# Patient Record
Sex: FEMALE | Race: WHITE | NOT HISPANIC OR LATINO | Employment: OTHER | URBAN - METROPOLITAN AREA
[De-identification: names, ages, dates, MRNs, and addresses within clinical notes are randomized per-mention and may not be internally consistent; named-entity substitution may affect disease eponyms.]

---

## 2018-01-16 ENCOUNTER — OFFICE VISIT (OUTPATIENT)
Dept: FAMILY MEDICINE | Facility: CLINIC | Age: 73
End: 2018-01-16
Payer: MEDICARE

## 2018-01-16 VITALS
WEIGHT: 124.13 LBS | BODY MASS INDEX: 22.84 KG/M2 | TEMPERATURE: 99 F | OXYGEN SATURATION: 100 % | DIASTOLIC BLOOD PRESSURE: 80 MMHG | HEART RATE: 79 BPM | HEIGHT: 62 IN | SYSTOLIC BLOOD PRESSURE: 136 MMHG

## 2018-01-16 DIAGNOSIS — M25.50 ARTHRALGIA, UNSPECIFIED JOINT: ICD-10-CM

## 2018-01-16 DIAGNOSIS — M16.9 OSTEOARTHRITIS OF HIP, UNSPECIFIED LATERALITY, UNSPECIFIED OSTEOARTHRITIS TYPE: ICD-10-CM

## 2018-01-16 DIAGNOSIS — M79.89 SWELLING OF HAND, UNSPECIFIED LATERALITY: Primary | ICD-10-CM

## 2018-01-16 DIAGNOSIS — Z74.09 OTHER REDUCED MOBILITY: ICD-10-CM

## 2018-01-16 DIAGNOSIS — F41.9 ANXIETY: ICD-10-CM

## 2018-01-16 DIAGNOSIS — I10 ESSENTIAL HYPERTENSION: ICD-10-CM

## 2018-01-16 DIAGNOSIS — M25.40 JOINT SWELLING: ICD-10-CM

## 2018-01-16 PROCEDURE — 99203 OFFICE O/P NEW LOW 30 MIN: CPT | Mod: S$GLB,,, | Performed by: FAMILY MEDICINE

## 2018-01-16 RX ORDER — HYDROCODONE BITARTRATE AND ACETAMINOPHEN 5; 325 MG/1; MG/1
TABLET ORAL
COMMUNITY
Start: 2018-01-02 | End: 2018-04-28

## 2018-01-16 RX ORDER — CALCIUM CARBONATE 1250 MG/5ML
500 SUSPENSION ORAL ONCE
COMMUNITY

## 2018-01-16 RX ORDER — CHOLECALCIFEROL (VITAMIN D3) 125 MCG
1 CAPSULE ORAL
COMMUNITY
End: 2022-06-08

## 2018-01-16 RX ORDER — PREDNISONE 20 MG/1
TABLET ORAL
Qty: 10 TABLET | Refills: 0 | Status: SHIPPED | OUTPATIENT
Start: 2018-01-16 | End: 2018-02-09

## 2018-01-16 RX ORDER — SERTRALINE HYDROCHLORIDE 25 MG/1
25 TABLET, FILM COATED ORAL DAILY
Qty: 30 TABLET | Refills: 11 | Status: SHIPPED | OUTPATIENT
Start: 2018-01-16 | End: 2022-06-08

## 2018-01-18 LAB
ALBUMIN SERPL-MCNC: 3.8 G/DL (ref 3.6–5.1)
ALBUMIN/GLOB SERPL: 1.2 (CALC) (ref 1–2.5)
ALP SERPL-CCNC: 86 U/L (ref 33–130)
ALT SERPL-CCNC: 8 U/L (ref 6–29)
AST SERPL-CCNC: 13 U/L (ref 10–35)
BASOPHILS # BLD AUTO: 77 CELLS/UL (ref 0–200)
BASOPHILS NFR BLD AUTO: 0.9 %
BILIRUB SERPL-MCNC: 0.4 MG/DL (ref 0.2–1.2)
BUN SERPL-MCNC: 17 MG/DL (ref 7–25)
BUN/CREAT SERPL: NORMAL (CALC) (ref 6–22)
CALCIUM SERPL-MCNC: 9.9 MG/DL (ref 8.6–10.4)
CHLORIDE SERPL-SCNC: 105 MMOL/L (ref 98–110)
CHOLEST SERPL-MCNC: 190 MG/DL
CHOLEST/HDLC SERPL: 3 (CALC)
CO2 SERPL-SCNC: 30 MMOL/L (ref 20–31)
CREAT SERPL-MCNC: 0.75 MG/DL (ref 0.6–0.93)
CRP SERPL HS-MCNC: 25.1 MG/L
EOSINOPHIL # BLD AUTO: 198 CELLS/UL (ref 15–500)
EOSINOPHIL NFR BLD AUTO: 2.3 %
ERYTHROCYTE [DISTWIDTH] IN BLOOD BY AUTOMATED COUNT: 11.9 % (ref 11–15)
ERYTHROCYTE [SEDIMENTATION RATE] IN BLOOD BY WESTERGREN METHOD: 79 MM/H
GFR SERPL CREATININE-BSD FRML MDRD: 80 ML/MIN/1.73M2
GLOBULIN SER CALC-MCNC: 3.2 G/DL (CALC) (ref 1.9–3.7)
GLUCOSE SERPL-MCNC: 94 MG/DL (ref 65–99)
HCT VFR BLD AUTO: 36.6 % (ref 35–45)
HDLC SERPL-MCNC: 64 MG/DL
HGB BLD-MCNC: 12.2 G/DL (ref 11.7–15.5)
LDLC SERPL CALC-MCNC: 110 MG/DL (CALC)
LYMPHOCYTES # BLD AUTO: 2116 CELLS/UL (ref 850–3900)
LYMPHOCYTES NFR BLD AUTO: 24.6 %
MCH RBC QN AUTO: 30 PG (ref 27–33)
MCHC RBC AUTO-ENTMCNC: 33.3 G/DL (ref 32–36)
MCV RBC AUTO: 89.9 FL (ref 80–100)
MONOCYTES # BLD AUTO: 542 CELLS/UL (ref 200–950)
MONOCYTES NFR BLD AUTO: 6.3 %
NEUTROPHILS # BLD AUTO: 5667 CELLS/UL (ref 1500–7800)
NEUTROPHILS NFR BLD AUTO: 65.9 %
NONHDLC SERPL-MCNC: 126 MG/DL (CALC)
PLATELET # BLD AUTO: 401 THOUSAND/UL (ref 140–400)
PMV BLD REES-ECKER: 10 FL (ref 7.5–12.5)
POTASSIUM SERPL-SCNC: 4.2 MMOL/L (ref 3.5–5.3)
PROT SERPL-MCNC: 7 G/DL (ref 6.1–8.1)
RBC # BLD AUTO: 4.07 MILLION/UL (ref 3.8–5.1)
SODIUM SERPL-SCNC: 141 MMOL/L (ref 135–146)
TRIGL SERPL-MCNC: 74 MG/DL
TSH SERPL-ACNC: 0.55 MIU/L (ref 0.4–4.5)
URATE SERPL-MCNC: 5.5 MG/DL (ref 2.5–7)
WBC # BLD AUTO: 8.6 THOUSAND/UL (ref 3.8–10.8)

## 2018-01-19 ENCOUNTER — TELEPHONE (OUTPATIENT)
Dept: FAMILY MEDICINE | Facility: CLINIC | Age: 73
End: 2018-01-19

## 2018-01-19 DIAGNOSIS — Z12.11 COLON CANCER SCREENING: ICD-10-CM

## 2018-01-19 NOTE — TELEPHONE ENCOUNTER
Patient returned your phone call - I tried to help her.   She did get your message but she wanted to discuss results  With you.  I advised you were not in today and she should receive a phone call monday

## 2018-01-20 NOTE — PROGRESS NOTES
Patient ID: Yajaira Garcia is a 72 y.o. female.    Chief Complaint: Arm Pain (left arm pain)    HPI      Yajaira Garcia is a 72 y.o. female. here for initial examination.   Patient with complaints of left hand and wrist swelling within the last several weeks.  Mild erythema mild swelling and moderate to severe pain.  Seen at previous urgent care given steroids mild relief of symptoms.  Does have right hand-recent tendinitis-awaiting surgery with Dr. Jesse Montenegro.  No history of gout or other inflammatory disease.  No recent trauma to the left hand area.  No fever chills nausea vomiting or diarrhea.  Hypertension-controlled normal resting milligrams daily  Patient states that she has tremendous amount of anxiety stress is depressed.  Patient admits distress because of abdominal business.        Review of Symptoms    Constitutional: Negative.    HENT: Negative.    Eyes: Negative.    Respiratory: Negative.    Cardiovascular: Negative.    Gastrointestinal: Negative.    Endocrine: Negative.    Genitourinary: Negative.    Musculoskeletal: Negative.    Skin: Negative.    Allergic/Immunologic: Negative.    Neurological: Negative.    Hematological: Negative.    Psychiatric/Behavioral: Negative.      Except as above in HPI        Physical  Exam    Constitutional:  Oriented to person, place, and time. Appears well-developed and well-nourished.     HENT:   Head: Normocephalic and atraumatic.     Right Ear: Tympanic membrane, external ear and ear canal normal.     Left Ear: Tympanic membrane, external ear and ear canal normal.     Nose: Nose normal. No rhinorrhea or nasal deformity.     Mouth/Throat: Uvula is midline, oropharynx is clear and moist and mucous membranes are normal.      Eyes: Conjunctivae are normal. Right eye exhibits no discharge. Left eye exhibits no discharge. No scleral icterus.     Neck:  No JVD present. No tracheal deviation  [x]  Neck supple.   [x]  No Carotid bruit    Cardiovascular: Normal rate, regular  rhythm and normal heart sounds.      Pulmonary/Chest: Effort normal and breath sounds normal. No stridor. No respiratory distress. No wheezes. No rales.      Musculoskeletal: Normal range of motion. No edema or tenderness.   No deformity  upper and lower extremity except for left hand and wrist and right hand  Left hand 1+ swelling no skin changes mild erythema-swelling of fingers.  Range of motion within normal limits except for swelling    Right hand decreased range of motion right thumb because of pain    Lymphadenopathy:  No cervical adenopathy.     Neurological:  Alert and oriented to person, place, and time. Coordination normal.     Skin: Skin is warm and dry. No rash noted.     Psychiatric: Normal mood and affect. Speech is normal and behavior is normal. Judgment and thought content normal.     Complete Blood Count  Lab Results   Component Value Date    RBC 4.07 01/17/2018    HGB 12.2 01/17/2018    HCT 36.6 01/17/2018    MCV 89.9 01/17/2018    MCH 30.0 01/17/2018    MCHC 33.3 01/17/2018    RDW 11.9 01/17/2018     (H) 01/17/2018    MPV 10.0 01/17/2018    GRAN 4.7 12/21/2016    GRAN 56.7 12/21/2016    LYMPH 2,116 01/17/2018    LYMPH 24.6 01/17/2018    MONO 542 01/17/2018    MONO 6.3 01/17/2018     01/17/2018    BASO 77 01/17/2018    EOSINOPHIL 2.3 01/17/2018    BASOPHIL 0.9 01/17/2018    DIFFMETHOD Automated 12/21/2016       Comprehensive Metabolic Panel  Lab Results   Component Value Date    GLU 94 01/17/2018    BUN 17 01/17/2018    CREATININE 0.75 01/17/2018     01/17/2018    K 4.2 01/17/2018     01/17/2018    PROT 7.0 01/17/2018    ALBUMIN 3.8 01/17/2018    BILITOT 0.4 01/17/2018    AST 13 01/17/2018    ALKPHOS 86 01/17/2018    CO2 30 01/17/2018    ALT 8 01/17/2018    EGFRNONAA 80 01/17/2018    ESTGFRAFRICA 93 01/17/2018       TSH  Lab Results   Component Value Date    TSH 0.55 01/17/2018       Assessment / Plan:      ICD-10-CM ICD-9-CM   1. Swelling of hand, unspecified laterality  M79.89 729.81   2. Arthralgia, unspecified joint M25.50 719.40   3. Osteoarthritis of hip, unspecified laterality, unspecified osteoarthritis type M16.9 715.95   4. Anxiety F41.9 300.00   5. Essential hypertension I10 401.9   6. Joint swelling M25.40 719.00   7. Other reduced mobility  Z74.09 799.89     Swelling of hand, unspecified laterality  -     Comprehensive metabolic panel; Future  -     CBC auto differential; Future  -     Lipid panel; Future  -     TSH; Future  -     High sensitivity CRP (Cardiac CRP); Future; Expected date: 01/16/2018  -     Sedimentation rate, manual; Future; Expected date: 01/16/2018  -     Uric acid; Future; Expected date: 01/16/2018    Arthralgia, unspecified joint  -     Comprehensive metabolic panel; Future  -     CBC auto differential; Future  -     Lipid panel; Future  -     TSH; Future  -     High sensitivity CRP (Cardiac CRP); Future; Expected date: 01/16/2018  -     Sedimentation rate, manual; Future; Expected date: 01/16/2018  -     Uric acid; Future; Expected date: 01/16/2018    Osteoarthritis of hip, unspecified laterality, unspecified osteoarthritis type  -     Comprehensive metabolic panel; Future  -     CBC auto differential; Future  -     Lipid panel; Future  -     TSH; Future  -     High sensitivity CRP (Cardiac CRP); Future; Expected date: 01/16/2018  -     Sedimentation rate, manual; Future; Expected date: 01/16/2018  -     Uric acid; Future; Expected date: 01/16/2018    Anxiety  -     Comprehensive metabolic panel; Future  -     CBC auto differential; Future  -     Lipid panel; Future  -     TSH; Future  -     High sensitivity CRP (Cardiac CRP); Future; Expected date: 01/16/2018  -     Sedimentation rate, manual; Future; Expected date: 01/16/2018  -     Uric acid; Future; Expected date: 01/16/2018    Essential hypertension  -     Comprehensive metabolic panel; Future  -     CBC auto differential; Future  -     Lipid panel; Future  -     TSH; Future  -     High  sensitivity CRP (Cardiac CRP); Future; Expected date: 01/16/2018  -     Sedimentation rate, manual; Future; Expected date: 01/16/2018  -     Uric acid; Future; Expected date: 01/16/2018    Joint swelling  -     Comprehensive metabolic panel; Future  -     CBC auto differential; Future  -     Lipid panel; Future  -     TSH; Future  -     High sensitivity CRP (Cardiac CRP); Future; Expected date: 01/16/2018  -     Sedimentation rate, manual; Future; Expected date: 01/16/2018  -     Uric acid; Future; Expected date: 01/16/2018    Other reduced mobility   -     High sensitivity CRP (Cardiac CRP); Future; Expected date: 01/16/2018    Other orders  -     predniSONE (DELTASONE) 20 MG tablet; Two po daily  Dispense: 10 tablet; Refill: 0  -     sertraline (ZOLOFT) 25 MG tablet; Take 1 tablet (25 mg total) by mouth once daily.  Dispense: 30 tablet; Refill: 11  -     Sedimentation rate, automated          Pressure of inflammatory reason for swelling left-start prednisone taper test CRP sedimentation rate    Discussed depression-anxiety-suggested using Zoloft but notices increase in the next couple months.  Discussed that primary goal would be to have the side effects then increase to help reduce anxiety

## 2018-02-09 ENCOUNTER — OFFICE VISIT (OUTPATIENT)
Dept: FAMILY MEDICINE | Facility: CLINIC | Age: 73
End: 2018-02-09
Payer: MEDICARE

## 2018-02-09 VITALS
HEART RATE: 76 BPM | DIASTOLIC BLOOD PRESSURE: 76 MMHG | WEIGHT: 119.38 LBS | OXYGEN SATURATION: 100 % | BODY MASS INDEX: 21.97 KG/M2 | TEMPERATURE: 98 F | SYSTOLIC BLOOD PRESSURE: 130 MMHG | HEIGHT: 62 IN

## 2018-02-09 DIAGNOSIS — M79.89 SWELLING OF HAND, UNSPECIFIED LATERALITY: Primary | ICD-10-CM

## 2018-02-09 DIAGNOSIS — F41.9 ANXIETY: ICD-10-CM

## 2018-02-09 DIAGNOSIS — I10 ESSENTIAL HYPERTENSION: ICD-10-CM

## 2018-02-09 DIAGNOSIS — M25.50 ARTHRALGIA, UNSPECIFIED JOINT: ICD-10-CM

## 2018-02-09 PROCEDURE — 1126F AMNT PAIN NOTED NONE PRSNT: CPT | Mod: S$GLB,,, | Performed by: FAMILY MEDICINE

## 2018-02-09 PROCEDURE — 99212 OFFICE O/P EST SF 10 MIN: CPT | Mod: S$GLB,,, | Performed by: FAMILY MEDICINE

## 2018-02-09 PROCEDURE — 1159F MED LIST DOCD IN RCRD: CPT | Mod: S$GLB,,, | Performed by: FAMILY MEDICINE

## 2018-02-09 RX ORDER — AMOXICILLIN 250 MG
1 CAPSULE ORAL 2 TIMES DAILY
Qty: 60 TABLET | Refills: 0 | Status: CANCELLED | OUTPATIENT
Start: 2018-02-09

## 2018-02-09 RX ORDER — ONDANSETRON 4 MG/1
TABLET, FILM COATED ORAL
COMMUNITY
Start: 2018-01-30 | End: 2022-06-08

## 2018-02-17 ENCOUNTER — HOSPITAL ENCOUNTER (OUTPATIENT)
Dept: RADIOLOGY | Facility: HOSPITAL | Age: 73
Discharge: HOME | End: 2018-02-17
Attending: FAMILY MEDICINE
Payer: MEDICARE

## 2018-02-17 VITALS — WEIGHT: 119 LBS | BODY MASS INDEX: 21.9 KG/M2 | HEIGHT: 62 IN

## 2018-02-17 DIAGNOSIS — Z12.31 SCREENING MAMMOGRAM, ENCOUNTER FOR: ICD-10-CM

## 2018-02-17 PROCEDURE — 77067 SCR MAMMO BI INCL CAD: CPT | Mod: TC,PO

## 2018-02-18 RX ORDER — AMOXICILLIN 250 MG
1 CAPSULE ORAL 2 TIMES DAILY
Qty: 60 TABLET | Refills: 0 | Status: SHIPPED | OUTPATIENT
Start: 2018-02-18 | End: 2022-06-08

## 2018-02-19 ENCOUNTER — TELEPHONE (OUTPATIENT)
Dept: FAMILY MEDICINE | Facility: CLINIC | Age: 73
End: 2018-02-19

## 2018-02-19 NOTE — PROGRESS NOTES
Patient ID: Yajaira Garcia is a 72 y.o. female.    Chief Complaint: Follow-up    HPI       Yajaira Garcia is a 72 y.o. female following up on left hand which is getting better with less pain and swelling.  Pt also with better mood and doing well on this dose of medicine and she does not want to change      Review of Symptoms    Constitutional  No change in activity, No chills fever   Resp  Neg hemoptysis, stridor, choking  CVS  Neg chest pain, palpitations    Physical Exam    Constitutional:   Oriented to person, place, and time.appears well-developed and well-nourished.   No distress.     HENT  Head: Normocephalic and atraumatic  Right Ear: External ear normal.   Left Ear: External ear normal.   Nose: External nose normal.   Mouth: Moist mucous membranes    Eyes:   Conjunctivae are normal. Right eye exhibits no discharge. Left eye exhibits no discharge. No scleral icterus. No periorbital edema    Musculoskeletal:  No edema. No obvious deformity No wasting     Neurological:  Alert and oriented to person, place, and time. Coordination normal.     Skin:   Skin is warm and dry.  No diaphoresis.   No rash noted.     Psychiatric: Normal mood and affect. Behavior is normal. Judgment and thought content normal.       Assessment / Plan:      ICD-10-CM ICD-9-CM   1. Swelling of hand, unspecified laterality M79.89 729.81   2. Arthralgia, unspecified joint M25.50 719.40   3. Anxiety F41.9 300.00   4. Essential hypertension I10 401.9     Swelling of hand, unspecified laterality    Arthralgia, unspecified joint    Anxiety    Essential hypertension    Other orders  -     Cancel: senna-docusate 8.6-50 mg (PERICOLACE) 8.6-50 mg per tablet; Take 1 tablet by mouth 2 (two) times daily.  Dispense: 60 tablet; Refill: 0  -     senna-docusate 8.6-50 mg (PERICOLACE) 8.6-50 mg per tablet; Take 1 tablet by mouth 2 (two) times daily.  Dispense: 60 tablet; Refill: 0

## 2018-02-20 ENCOUNTER — TELEPHONE (OUTPATIENT)
Dept: RADIOLOGY | Facility: HOSPITAL | Age: 73
End: 2018-02-20

## 2018-02-20 NOTE — TELEPHONE ENCOUNTER
Your mammogram was not complete additional views will be needed.  They should contact you if not please call our office.

## 2018-02-21 ENCOUNTER — HOSPITAL ENCOUNTER (OUTPATIENT)
Dept: RADIOLOGY | Facility: HOSPITAL | Age: 73
Discharge: HOME | End: 2018-02-21
Attending: FAMILY MEDICINE
Payer: MEDICARE

## 2018-02-21 DIAGNOSIS — R92.8 ABNORMAL MAMMOGRAM: ICD-10-CM

## 2018-02-21 NOTE — TELEPHONE ENCOUNTER
I left message for the pt to rtn call  To discuss mammogram results   See message below  I see pt had u/s scheduled yesterday and canceled

## 2018-02-22 NOTE — TELEPHONE ENCOUNTER
I spoke with the pt daughter and she has a skin tag on her breast that was causing the concern.   She said the radiologist reread the mammogram and everything is ok.

## 2018-02-26 RX ORDER — AMLODIPINE BESYLATE 10 MG/1
10 TABLET ORAL DAILY
Qty: 90 TABLET | Refills: 0 | Status: SHIPPED | OUTPATIENT
Start: 2018-02-26 | End: 2018-05-31 | Stop reason: SDUPTHER

## 2018-04-28 ENCOUNTER — HOSPITAL ENCOUNTER (EMERGENCY)
Facility: HOSPITAL | Age: 73
Discharge: HOME | End: 2018-04-28
Attending: EMERGENCY MEDICINE
Payer: MEDICARE

## 2018-04-28 VITALS
BODY MASS INDEX: 21.53 KG/M2 | WEIGHT: 117 LBS | SYSTOLIC BLOOD PRESSURE: 154 MMHG | DIASTOLIC BLOOD PRESSURE: 67 MMHG | RESPIRATION RATE: 20 BRPM | OXYGEN SATURATION: 96 % | HEIGHT: 62 IN | TEMPERATURE: 98 F | HEART RATE: 56 BPM

## 2018-04-28 DIAGNOSIS — M79.673 FOOT PAIN: ICD-10-CM

## 2018-04-28 DIAGNOSIS — M79.671 RIGHT FOOT PAIN: Primary | ICD-10-CM

## 2018-04-28 PROCEDURE — 25000003 PHARM REV CODE 250: Performed by: EMERGENCY MEDICINE

## 2018-04-28 PROCEDURE — 99283 EMERGENCY DEPT VISIT LOW MDM: CPT

## 2018-04-28 RX ORDER — TRAMADOL HYDROCHLORIDE 50 MG/1
50 TABLET ORAL
Status: COMPLETED | OUTPATIENT
Start: 2018-04-28 | End: 2018-04-28

## 2018-04-28 RX ORDER — HYDROCODONE BITARTRATE AND ACETAMINOPHEN 5; 325 MG/1; MG/1
1 TABLET ORAL EVERY 4 HOURS PRN
Qty: 18 TABLET | Refills: 0 | Status: SHIPPED | OUTPATIENT
Start: 2018-04-28

## 2018-04-28 RX ORDER — HYDROCODONE BITARTRATE AND ACETAMINOPHEN 5; 325 MG/1; MG/1
1 TABLET ORAL EVERY 4 HOURS PRN
Qty: 18 TABLET | Refills: 0 | Status: CANCELLED | OUTPATIENT
Start: 2018-04-28

## 2018-04-28 RX ADMIN — TRAMADOL HYDROCHLORIDE 50 MG: 50 TABLET, COATED ORAL at 11:04

## 2018-04-29 NOTE — ED PROVIDER NOTES
Encounter Date: 4/28/2018       History     Chief Complaint   Patient presents with    Foot Injury     right foot swelling and pain that began today, no injury      Patient comes emergency Department with a one-day history of right heel and right lateral foot pain pain is worse with weightbearing and is described as moderate to severe in intensity.  Patient is unaware of any trauma or injury.          Review of patient's allergies indicates:  No Known Allergies  Past Medical History:   Diagnosis Date    Arthritis     Hypertension     Stroke 10/2015    no residuals     Past Surgical History:   Procedure Laterality Date    CARPAL TUNNEL RELEASE Bilateral 1998    Gastric Sleeve  08/2016    HIATAL HERNIA REPAIR  08/2016    HIP SURGERY Right 12/27/2017    KIDNEY STONE SURGERY Right     2010    TUBAL LIGATION       Family History   Problem Relation Age of Onset    Dementia Mother     Cancer Father     No Known Problems Brother      Social History   Substance Use Topics    Smoking status: Never Smoker    Smokeless tobacco: Never Used    Alcohol use No     Review of Systems   Constitutional: Negative for fatigue and fever.   HENT: Negative for sore throat.    Respiratory: Negative for chest tightness and shortness of breath.    Cardiovascular: Negative for chest pain and leg swelling.   Gastrointestinal: Negative for abdominal pain, nausea and vomiting.   Genitourinary: Negative for dysuria.   Musculoskeletal: Negative for back pain and joint swelling.        See history of present illness   Skin: Negative for rash.   Neurological: Negative for weakness.   Hematological: Does not bruise/bleed easily.   All other systems reviewed and are negative.      Physical Exam     Initial Vitals   BP Pulse Resp Temp SpO2   04/28/18 2120 04/28/18 2120 04/28/18 2120 04/28/18 2123 --   (!) 168/74 (!) 56 18 97.8 °F (36.6 °C)       MAP       04/28/18 2120       105.33         Physical Exam    Nursing note and vitals  reviewed.  Constitutional: Vital signs are normal. She appears well-developed and well-nourished. She is not diaphoretic. No distress.   HENT:   Head: Normocephalic and atraumatic.   Eyes: Conjunctivae and EOM are normal. Pupils are equal, round, and reactive to light.   Neck: Normal range of motion. Neck supple.   Cardiovascular: Normal rate, regular rhythm and normal heart sounds.   Pulmonary/Chest: Breath sounds normal. No respiratory distress. She has no wheezes. She has no rhonchi. She has no rales.   Abdominal: Soft. She exhibits no distension. There is no tenderness. There is no rebound and no guarding.   Musculoskeletal: Normal range of motion. She exhibits tenderness. She exhibits no edema.   Tenderness to right heel and right lateral foot.  Skin is intact or swelling no deformities.  Foot is warm and well-perfused.   Neurological: She is alert and oriented to person, place, and time.   Skin: Skin is warm and dry.   Psychiatric: She has a normal mood and affect.         ED Course   Procedures  Labs Reviewed - No data to display                          Imaging Results          X-Ray Foot Complete Right (Final result)  Result time 04/28/18 22:21:55    Final result by Cortez Ji MD (Timothy) (04/28/18 22:21:55)                 Impression:        Calcaneal spurring.  No acute bony abnormalities.      Electronically signed by: CORTEZ JI MD  Date:     04/28/18  Time:    22:21              Narrative:    Right foot, 3 views    Clinical History:  Right foot pain    Findings:     There is  no acute bony or joint abnormality. No acute fracture or dislocation.There is spurring of the calcaneus.                                    Clinical Impression:   The primary encounter diagnosis was Right foot pain. A diagnosis of Foot pain was also pertinent to this visit.    Disposition:   Disposition: Discharged  Condition: Stable                        Orlin Cisneros MD  04/28/18 2578

## 2018-04-29 NOTE — ED NOTES
Pt provided with a walker instead of crutches per request. MD aware. Pt returned demonstration to ambulate with walker successfully.

## 2018-05-31 RX ORDER — AMLODIPINE BESYLATE 10 MG/1
TABLET ORAL
Qty: 90 TABLET | Refills: 0 | Status: SHIPPED | OUTPATIENT
Start: 2018-05-31 | End: 2018-09-01 | Stop reason: SDUPTHER

## 2018-09-03 RX ORDER — AMLODIPINE BESYLATE 10 MG/1
TABLET ORAL
Qty: 90 TABLET | Refills: 0 | Status: SHIPPED | OUTPATIENT
Start: 2018-09-03 | End: 2022-06-08

## 2019-01-24 DIAGNOSIS — Z12.11 COLON CANCER SCREENING: ICD-10-CM

## 2019-09-09 ENCOUNTER — PATIENT OUTREACH (OUTPATIENT)
Dept: ADMINISTRATIVE | Facility: HOSPITAL | Age: 74
End: 2019-09-09

## 2021-10-12 ENCOUNTER — LAB VISIT (OUTPATIENT)
Dept: LAB | Facility: HOSPITAL | Age: 76
End: 2021-10-12
Payer: MEDICARE

## 2021-10-12 DIAGNOSIS — Z01.818 ENCOUNTER FOR OTHER PREPROCEDURAL EXAMINATION: Primary | ICD-10-CM

## 2021-10-12 LAB
ANION GAP SERPL CALC-SCNC: 9 MMOL/L (ref 8–16)
BACTERIA #/AREA URNS AUTO: ABNORMAL /HPF
BASOPHILS # BLD AUTO: 0 K/UL (ref 0–0.2)
BASOPHILS NFR BLD: 0 % (ref 0–1.9)
BILIRUB UR QL STRIP: NEGATIVE
CALCIUM SERPL-MCNC: 10.1 MG/DL (ref 8.7–10.5)
CHLORIDE SERPL-SCNC: 106 MMOL/L (ref 95–110)
CLARITY UR REFRACT.AUTO: CLEAR
CO2 SERPL-SCNC: 26 MMOL/L (ref 23–29)
COLOR UR AUTO: ABNORMAL
CREAT SERPL-MCNC: 0.89 MG/DL (ref 0.5–1.4)
DIFFERENTIAL METHOD: ABNORMAL
EOSINOPHIL # BLD AUTO: 0 K/UL (ref 0–0.5)
EOSINOPHIL NFR BLD: 0 % (ref 0–8)
ERYTHROCYTE [DISTWIDTH] IN BLOOD BY AUTOMATED COUNT: 13.6 % (ref 11.5–14.5)
EST. GFR  (AFRICAN AMERICAN): >60 ML/MIN/1.73 M^2
EST. GFR  (NON AFRICAN AMERICAN): >60 ML/MIN/1.73 M^2
GLUCOSE SERPL-MCNC: 117 MG/DL (ref 70–110)
GLUCOSE UR QL STRIP: NEGATIVE
HCT VFR BLD AUTO: 38.7 % (ref 37–48.5)
HGB BLD-MCNC: 12.6 G/DL (ref 12–16)
HGB UR QL STRIP: ABNORMAL
HYALINE CASTS UR QL AUTO: 2 /LPF
IMM GRANULOCYTES # BLD AUTO: 0.06 K/UL (ref 0–0.04)
IMM GRANULOCYTES NFR BLD AUTO: 0.5 % (ref 0–0.5)
KETONES UR QL STRIP: NEGATIVE
LEUKOCYTE ESTERASE UR QL STRIP: ABNORMAL
LYMPHOCYTES # BLD AUTO: 0.9 K/UL (ref 1–4.8)
LYMPHOCYTES NFR BLD: 7.3 % (ref 18–48)
MCH RBC QN AUTO: 30.4 PG (ref 27–31)
MCHC RBC AUTO-ENTMCNC: 32.6 G/DL (ref 32–36)
MCV RBC AUTO: 94 FL (ref 82–98)
MICROSCOPIC COMMENT: ABNORMAL
MONOCYTES # BLD AUTO: 0.5 K/UL (ref 0.3–1)
MONOCYTES NFR BLD: 3.9 % (ref 4–15)
NEUTROPHILS # BLD AUTO: 10.5 K/UL (ref 1.8–7.7)
NEUTROPHILS NFR BLD: 88.3 % (ref 38–73)
NITRITE UR QL STRIP: NEGATIVE
NRBC BLD-RTO: 0 /100 WBC
PH UR STRIP: 5 [PH] (ref 5–8)
PLATELET # BLD AUTO: 304 K/UL (ref 150–450)
PMV BLD AUTO: 10 FL (ref 9.2–12.9)
POTASSIUM SERPL-SCNC: 4.9 MMOL/L (ref 3.5–5.1)
PROT UR QL STRIP: ABNORMAL
RBC # BLD AUTO: 4.14 M/UL (ref 4–5.4)
RBC #/AREA URNS AUTO: 0 /HPF (ref 0–4)
SODIUM SERPL-SCNC: 141 MMOL/L (ref 136–145)
SP GR UR STRIP: 1.01 (ref 1–1.03)
URN SPEC COLLECT METH UR: ABNORMAL
UROBILINOGEN UR STRIP-ACNC: 1 EU/DL
UUN UR-MCNC: 30 MG/DL (ref 7–17)
WBC # BLD AUTO: 11.86 K/UL (ref 3.9–12.7)
WBC #/AREA URNS AUTO: 3 /HPF (ref 0–5)

## 2021-10-12 PROCEDURE — 81000 URINALYSIS NONAUTO W/SCOPE: CPT | Mod: PO

## 2021-10-12 PROCEDURE — 85025 COMPLETE CBC W/AUTO DIFF WBC: CPT | Mod: PO

## 2021-10-12 PROCEDURE — 80048 BASIC METABOLIC PNL TOTAL CA: CPT | Mod: PO

## 2021-10-12 PROCEDURE — 36415 COLL VENOUS BLD VENIPUNCTURE: CPT | Mod: PO

## 2021-10-13 ENCOUNTER — HOSPITAL ENCOUNTER (OUTPATIENT)
Dept: CARDIOLOGY | Facility: HOSPITAL | Age: 76
Discharge: HOME | End: 2021-10-13
Payer: MEDICARE

## 2021-10-13 DIAGNOSIS — Z01.818 PREOP EXAMINATION: ICD-10-CM

## 2021-10-13 PROCEDURE — 93010 EKG 12-LEAD: ICD-10-PCS | Mod: ,,, | Performed by: INTERNAL MEDICINE

## 2021-10-13 PROCEDURE — 93010 ELECTROCARDIOGRAM REPORT: CPT | Mod: ,,, | Performed by: INTERNAL MEDICINE

## 2021-10-13 PROCEDURE — 93005 ELECTROCARDIOGRAM TRACING: CPT | Mod: PO

## 2021-10-26 ENCOUNTER — OFFICE VISIT (OUTPATIENT)
Dept: FAMILY MEDICINE | Facility: CLINIC | Age: 76
End: 2021-10-26
Payer: MEDICARE

## 2021-10-26 ENCOUNTER — TELEPHONE (OUTPATIENT)
Dept: FAMILY MEDICINE | Facility: CLINIC | Age: 76
End: 2021-10-26
Payer: MEDICARE

## 2021-10-26 VITALS
TEMPERATURE: 98 F | OXYGEN SATURATION: 96 % | SYSTOLIC BLOOD PRESSURE: 134 MMHG | DIASTOLIC BLOOD PRESSURE: 78 MMHG | BODY MASS INDEX: 22.26 KG/M2 | WEIGHT: 121 LBS | HEIGHT: 62 IN | HEART RATE: 56 BPM

## 2021-10-26 DIAGNOSIS — N95.9 MENOPAUSAL AND POSTMENOPAUSAL DISORDER: ICD-10-CM

## 2021-10-26 DIAGNOSIS — Z01.818 PREOPERATIVE EXAMINATION: Primary | ICD-10-CM

## 2021-10-26 DIAGNOSIS — Z12.11 ENCOUNTER FOR SCREENING FOR MALIGNANT NEOPLASM OF COLON: ICD-10-CM

## 2021-10-26 PROCEDURE — 99204 OFFICE O/P NEW MOD 45 MIN: CPT | Mod: S$GLB,,, | Performed by: FAMILY MEDICINE

## 2021-10-26 PROCEDURE — 99204 PR OFFICE/OUTPT VISIT, NEW, LEVL IV, 45-59 MIN: ICD-10-PCS | Mod: S$GLB,,, | Performed by: FAMILY MEDICINE

## 2021-10-27 ENCOUNTER — HOSPITAL ENCOUNTER (OUTPATIENT)
Dept: RADIOLOGY | Facility: HOSPITAL | Age: 76
Discharge: HOME | End: 2021-10-27
Attending: FAMILY MEDICINE
Payer: MEDICARE

## 2021-10-27 DIAGNOSIS — N95.9 MENOPAUSAL AND POSTMENOPAUSAL DISORDER: ICD-10-CM

## 2021-10-27 PROCEDURE — 77080 DXA BONE DENSITY AXIAL: CPT | Mod: TC,PO

## 2021-10-28 ENCOUNTER — TELEPHONE (OUTPATIENT)
Dept: FAMILY MEDICINE | Facility: CLINIC | Age: 76
End: 2021-10-28
Payer: MEDICARE

## 2021-11-13 ENCOUNTER — TELEPHONE (OUTPATIENT)
Dept: FAMILY MEDICINE | Facility: CLINIC | Age: 76
End: 2021-11-13
Payer: MEDICARE

## 2021-11-13 LAB — NONINV COLON CA DNA+OCC BLD SCRN STL QL: NEGATIVE

## 2022-06-02 ENCOUNTER — TELEPHONE (OUTPATIENT)
Dept: FAMILY MEDICINE | Facility: CLINIC | Age: 77
End: 2022-06-02
Payer: MEDICARE

## 2022-06-02 NOTE — TELEPHONE ENCOUNTER
Please advise on when you would like pt scheduled    ----- Message from Manasa Cano sent at 6/2/2022  3:47 PM CDT -----  Patient stopped by office needing an appointment for surgery clearance  Cataract surgery scheduled for 6/16 & 6/30(left/right)  Please call pt with appointment ( [phone])

## 2022-06-06 ENCOUNTER — TELEPHONE (OUTPATIENT)
Dept: FAMILY MEDICINE | Facility: CLINIC | Age: 77
End: 2022-06-06
Payer: MEDICARE

## 2022-06-06 NOTE — TELEPHONE ENCOUNTER
----- Message from Chel Todd sent at 6/6/2022  9:58 AM CDT -----  Needs advice from nurse:      Who Called:pt  Regarding:needs to scheduled surgery clearance-surgery is scheduled for 6/16  Would the patient rather a call back or [facility]IA [facility]?  Best Call Back number:[phone]  Additional Info:

## 2022-06-07 ENCOUNTER — TELEPHONE (OUTPATIENT)
Dept: FAMILY MEDICINE | Facility: CLINIC | Age: 77
End: 2022-06-07
Payer: MEDICARE

## 2022-06-07 NOTE — TELEPHONE ENCOUNTER
----- Message from Monae Santacruz sent at 6/7/2022 10:01 AM CDT -----  Regarding: surgery clearance  Contact: [phone]  Patient is requesting a call back regarding scheduling an appt for Cataract Surgery clearance. She is scheduled on 06/16 at 10am.   Would the patient rather a call back or a response via [facility]?  Call   Best Call Back Number:  [phone]  Additional Information:

## 2022-06-08 ENCOUNTER — OFFICE VISIT (OUTPATIENT)
Dept: FAMILY MEDICINE | Facility: CLINIC | Age: 77
End: 2022-06-08
Payer: MEDICARE

## 2022-06-08 VITALS
SYSTOLIC BLOOD PRESSURE: 180 MMHG | WEIGHT: 131.5 LBS | HEIGHT: 62 IN | TEMPERATURE: 99 F | OXYGEN SATURATION: 98 % | HEART RATE: 67 BPM | BODY MASS INDEX: 24.2 KG/M2 | DIASTOLIC BLOOD PRESSURE: 70 MMHG

## 2022-06-08 DIAGNOSIS — I10 HYPERTENSION, UNSPECIFIED TYPE: Primary | ICD-10-CM

## 2022-06-08 DIAGNOSIS — Z01.818 PREOPERATIVE EXAMINATION: ICD-10-CM

## 2022-06-08 PROCEDURE — 99213 OFFICE O/P EST LOW 20 MIN: CPT | Mod: S$GLB,,, | Performed by: FAMILY MEDICINE

## 2022-06-08 PROCEDURE — 99213 PR OFFICE/OUTPT VISIT, EST, LEVL III, 20-29 MIN: ICD-10-PCS | Mod: S$GLB,,, | Performed by: FAMILY MEDICINE

## 2022-06-08 RX ORDER — IBUPROFEN 800 MG/1
800 TABLET ORAL 3 TIMES DAILY
COMMUNITY
Start: 2022-06-07

## 2022-06-08 RX ORDER — PREDNISONE 20 MG/1
20 TABLET ORAL 2 TIMES DAILY
COMMUNITY
Start: 2022-06-07

## 2022-06-08 RX ORDER — AMLODIPINE BESYLATE 10 MG/1
10 TABLET ORAL DAILY
Qty: 30 TABLET | Refills: 11 | Status: SHIPPED | OUTPATIENT
Start: 2022-06-08 | End: 2022-10-12 | Stop reason: SDUPTHER

## 2022-06-08 NOTE — PROGRESS NOTES
Subjective:       Patient ID: Yajaira Garcia is a 76 y.o. female.    Chief Complaint: Pre-op Exam    75 y/o fmale with hx of arthritis and HTN, not on any regular medication, was supposed to be on Amlodipine , not taking,  At present on Prednisone for Arthritis  Denies headache or dizziness        Review of Systems    Objective:      Physical Exam  Vitals and nursing note reviewed.   Constitutional:       General: She is not in acute distress.     Appearance: Normal appearance. She is well-developed. She is not diaphoretic.   HENT:      Head: Normocephalic and atraumatic.      Right Ear: Tympanic membrane normal.      Left Ear: Tympanic membrane normal.      Nose: Nose normal.      Mouth/Throat:      Mouth: Mucous membranes are moist.      Pharynx: Oropharynx is clear.   Eyes:      General: Lids are normal.      Extraocular Movements: Extraocular movements intact.      Conjunctiva/sclera: Conjunctivae normal.      Pupils: Pupils are equal, round, and reactive to light.   Neck:      Trachea: Trachea and phonation normal.   Cardiovascular:      Rate and Rhythm: Normal rate and regular rhythm.      Pulses: Normal pulses.      Heart sounds: Normal heart sounds.   Pulmonary:      Effort: Pulmonary effort is normal.      Breath sounds: Normal breath sounds.   Abdominal:      General: Bowel sounds are normal. There is no abdominal bruit.      Palpations: Abdomen is soft. There is no mass or pulsatile mass.   Musculoskeletal:         General: No deformity.      Cervical back: Full passive range of motion without pain, normal range of motion and neck supple.   Skin:     General: Skin is warm and dry.   Neurological:      Mental Status: She is alert and oriented to person, place, and time.      Sensory: No sensory deficit.      Deep Tendon Reflexes: Reflexes are normal and symmetric.   Psychiatric:         Speech: Speech normal.         Behavior: Behavior normal. Behavior is cooperative.         Thought Content: Thought  content normal.         Judgment: Judgment normal.         Assessment:       No diagnosis found.    Plan:         Yajaira was seen today for pre-op exam.    Diagnoses and all orders for this visit:    Hypertension, unspecified type    Preoperative examination    Other orders  -     amLODIPine (NORVASC) 10 MG tablet; Take 1 tablet (10 mg total) by mouth once daily.     Pt will start medic , will need re-check BP in 5 days, otherwise she is cleared for eye sx

## 2022-06-14 ENCOUNTER — CLINICAL SUPPORT (OUTPATIENT)
Dept: FAMILY MEDICINE | Facility: CLINIC | Age: 77
End: 2022-06-14
Payer: MEDICARE

## 2022-06-14 VITALS — DIASTOLIC BLOOD PRESSURE: 82 MMHG | HEART RATE: 73 BPM | SYSTOLIC BLOOD PRESSURE: 130 MMHG

## 2022-06-14 DIAGNOSIS — I10 ESSENTIAL HYPERTENSION: Primary | ICD-10-CM

## 2022-06-14 NOTE — PROGRESS NOTES
Yajaira Garcia 76 y.o. female is here today for Blood Pressure check.   History of HTN yes.    Review of patient's allergies indicates:  No Known Allergies  Creatinine   Date Value Ref Range Status   10/12/2021 0.89 0.50 - 1.40 mg/dL Final     Sodium   Date Value Ref Range Status   10/12/2021 141 136 - 145 mmol/L Final     Potassium   Date Value Ref Range Status   10/12/2021 4.9 3.5 - 5.1 mmol/L Final   ]  Patient verifies taking blood pressure medications on a regular basis at the same time of the day.     Current Outpatient Medications:     amLODIPine (NORVASC) 10 MG tablet, Take 1 tablet (10 mg total) by mouth once daily., Disp: 30 tablet, Rfl: 11    calcium carbonate 500 mg/5 mL (1,250 mg/5 mL), Take 500 mg by mouth once., Disp: , Rfl:     hydrocodone-acetaminophen 5-325mg (NORCO) 5-325 mg per tablet, Take 1 tablet by mouth every 4 (four) hours as needed., Disp: 18 tablet, Rfl: 0    ibuprofen (ADVIL,MOTRIN) 800 MG tablet, Take 800 mg by mouth 3 (three) times daily., Disp: , Rfl:     multivitamin with minerals tablet, Take 2 tablets by mouth once daily., Disp: , Rfl:     predniSONE (DELTASONE) 20 MG tablet, Take 20 mg by mouth 2 (two) times daily., Disp: , Rfl:   Does patient have record of home blood pressure readings no.   Last dose of blood pressure medication was taken this morning.  Patient is asymptomatic.         ,   .    Blood pressure reading after 15 minutes was 130/82 Pulse 73.  Dr. Rodriguez notified.

## 2022-10-12 ENCOUNTER — TELEPHONE (OUTPATIENT)
Dept: FAMILY MEDICINE | Facility: CLINIC | Age: 77
End: 2022-10-12
Payer: MEDICARE

## 2022-10-12 RX ORDER — AMLODIPINE BESYLATE 10 MG/1
10 TABLET ORAL DAILY
Qty: 90 TABLET | Refills: 3 | Status: SHIPPED | OUTPATIENT
Start: 2022-10-12 | End: 2024-01-31

## 2022-10-12 NOTE — TELEPHONE ENCOUNTER
Patient had eye procedure today  BP elevated during procedure  228/86  230/96  Upon discharge 206/78  Out of amlodipine for 1 month  Appt scheduled dec 12  Can you please refill this med today     We need to call and notify pt daughterchel once done    ----- Message from Santa Garrido sent at 10/12/2022 10:18 AM CDT -----  Type:  Needs Medical Advice    Who Called: pt daughter  Symptoms (please be specific): pt is requesting a return call to discuss blood pressure medicine    Would the patient rather a call back or a response via [facility]? call  Best Call Back Number: [phone]  Additional Information:     Type:  RX Refill Request    Who Called: pt daughter  Refill or New Rx:refill  RX Name and Strength:amLODIPine (NORVASC) 10 MG tablet  How is the patient currently taking it? (ex. 1XDay):Take 1 tablet (10 mg total) by mouth once daily.   Is this a 30 day or 90 day RX:30  Preferred Pharmacy with phone number:[id number] [address]   Phone: [phone]  Fax:  [phone]        Local or Mail Order:local  Ordering Provider:Dr Rodriguez  Would the patient rather a call back or a response via [facility]? call  Best Call Back Number:[phone]  Additional Information:

## 2022-10-13 NOTE — TELEPHONE ENCOUNTER
Phylicia Guerrero, DO  [facility] Staff Yesterday (1:36 PM)     I have sent refills of the amlodipine.  Have her check her BP at home.

## 2022-12-12 ENCOUNTER — TELEPHONE (OUTPATIENT)
Dept: FAMILY MEDICINE | Facility: CLINIC | Age: 77
End: 2022-12-12

## 2022-12-12 NOTE — TELEPHONE ENCOUNTER
----- Message from Monae Santacruz sent at 12/12/2022  8:44 AM CST -----  Regarding: same day  Contact: [phone]/marques Cadena  Type:  Same Day Appointment Request    Caller is requesting a same day appointment.  Caller declined first available appointment listed below.    Name of Caller: marques Cadena   When is the first available appointment?   Symptoms: medication check up/Annual   Best Call Back Number: [phone]/marques Cadena  Additional Information:

## 2023-03-28 ENCOUNTER — OFFICE VISIT (OUTPATIENT)
Dept: FAMILY MEDICINE | Facility: CLINIC | Age: 78
End: 2023-03-28
Payer: MEDICARE

## 2023-03-28 ENCOUNTER — TELEPHONE (OUTPATIENT)
Dept: FAMILY MEDICINE | Facility: CLINIC | Age: 78
End: 2023-03-28

## 2023-03-28 VITALS
HEART RATE: 55 BPM | DIASTOLIC BLOOD PRESSURE: 70 MMHG | TEMPERATURE: 99 F | OXYGEN SATURATION: 98 % | BODY MASS INDEX: 26.86 KG/M2 | HEIGHT: 62 IN | WEIGHT: 145.94 LBS | SYSTOLIC BLOOD PRESSURE: 158 MMHG

## 2023-03-28 DIAGNOSIS — Z23 NEED FOR PNEUMOCOCCAL VACCINATION: ICD-10-CM

## 2023-03-28 DIAGNOSIS — I10 ESSENTIAL HYPERTENSION: Primary | ICD-10-CM

## 2023-03-28 DIAGNOSIS — Z23 NEED FOR INFLUENZA VACCINATION: ICD-10-CM

## 2023-03-28 PROCEDURE — 90677 PCV20 VACCINE IM: CPT | Mod: S$GLB,,, | Performed by: FAMILY MEDICINE

## 2023-03-28 PROCEDURE — G0009 ADMIN PNEUMOCOCCAL VACCINE: HCPCS | Mod: S$GLB,,, | Performed by: FAMILY MEDICINE

## 2023-03-28 PROCEDURE — G0008 FLU VACCINE - QUADRIVALENT - ADJUVANTED: ICD-10-PCS | Mod: S$GLB,,, | Performed by: FAMILY MEDICINE

## 2023-03-28 PROCEDURE — 90677 PNEUMOCOCCAL CONJUGATE VACCINE 20-VALENT: ICD-10-PCS | Mod: S$GLB,,, | Performed by: FAMILY MEDICINE

## 2023-03-28 PROCEDURE — G0008 ADMIN INFLUENZA VIRUS VAC: HCPCS | Mod: S$GLB,,, | Performed by: FAMILY MEDICINE

## 2023-03-28 PROCEDURE — 90694 VACC AIIV4 NO PRSRV 0.5ML IM: CPT | Mod: S$GLB,,, | Performed by: FAMILY MEDICINE

## 2023-03-28 PROCEDURE — 99213 OFFICE O/P EST LOW 20 MIN: CPT | Mod: S$GLB,,, | Performed by: FAMILY MEDICINE

## 2023-03-28 PROCEDURE — 90694 FLU VACCINE - QUADRIVALENT - ADJUVANTED: ICD-10-PCS | Mod: S$GLB,,, | Performed by: FAMILY MEDICINE

## 2023-03-28 PROCEDURE — G0009 PNEUMOCOCCAL CONJUGATE VACCINE 20-VALENT: ICD-10-PCS | Mod: S$GLB,,, | Performed by: FAMILY MEDICINE

## 2023-03-28 PROCEDURE — 99213 PR OFFICE/OUTPT VISIT, EST, LEVL III, 20-29 MIN: ICD-10-PCS | Mod: S$GLB,,, | Performed by: FAMILY MEDICINE

## 2023-03-28 RX ORDER — COLCHICINE 0.6 MG/1
0.6 TABLET ORAL DAILY
Qty: 90 TABLET | Refills: 1 | Status: SHIPPED | OUTPATIENT
Start: 2023-03-28

## 2023-03-28 RX ORDER — COLCHICINE 0.6 MG/1
TABLET ORAL
COMMUNITY
Start: 2023-02-13 | End: 2023-03-28 | Stop reason: SDUPTHER

## 2023-03-28 RX ORDER — CELECOXIB 200 MG/1
CAPSULE ORAL
COMMUNITY
Start: 2023-02-13 | End: 2023-03-28 | Stop reason: SDUPTHER

## 2023-03-28 RX ORDER — NEBIVOLOL 2.5 MG/1
2.5 TABLET ORAL DAILY
Qty: 90 TABLET | Refills: 1 | Status: SHIPPED | OUTPATIENT
Start: 2023-03-28 | End: 2024-03-27

## 2023-03-28 RX ORDER — CELECOXIB 200 MG/1
200 CAPSULE ORAL DAILY
Qty: 90 CAPSULE | Refills: 1 | Status: SHIPPED | OUTPATIENT
Start: 2023-03-28

## 2023-03-28 RX ORDER — VALSARTAN 80 MG/1
80 TABLET ORAL DAILY
Qty: 90 TABLET | Refills: 3 | Status: SHIPPED | OUTPATIENT
Start: 2023-03-28 | End: 2023-04-11 | Stop reason: SDUPTHER

## 2023-03-28 NOTE — TELEPHONE ENCOUNTER
Dr Edward Freedman   This is Surinder Garcia   My wife Yajaira was prescribed for her blood pressure medicine which  is over $200.00  Please advise   Thanks   Surinder Garcia     This was sent via his portal      nebivoloL (BYSTOLIC) 2.5 MG Tab

## 2023-04-03 NOTE — PROGRESS NOTES
" Patient ID: Yajaira Garcia is a 77 y.o. female.    Chief Complaint: Annual Exam and Joint Pain    HPI      Yajaira Garcia is a 77 y.o. female here for annual examination.  Complains joint pain.  Complains that she is experiencing arthritic pain without relief significantly with over-the-counter medications.    Hypertension-blood pressure is not controlled on current medications but not having any symptoms.    Vitals:    03/28/23 1450   BP: (!) 158/70   BP Location: Left arm   Patient Position: Sitting   Pulse: (!) 55   Temp: 98.5 °F (36.9 °C)   TempSrc: Oral   SpO2: 98%   Weight: 66.2 kg (145 lb 15.1 oz)   Height: 5' 2" (1.575 m)            Review of Symptoms      Physical Exam    Constitutional:  Oriented to person, place, and time.appears well-developed and well-nourished.  No distress.      HENT  Head: Normocephalic and atraumatic  Right Ear: External ear normal.   Left Ear: External ear normal.   Nose: External nose normal.   Mouth:  Moist mucus membranes.    Eyes:  Conjunctivae are normal. Right eye exhibits no discharge.  Left eye exhibits no discharge. No scleral icterus.  No periorbital edema    Cardiovascular:  Regular rate and rhythm with normal S1 and S2     Pulmonary/Chest:   Clear to auscultation bilaterally without wheezes, rhonchi or rales      Musculoskeletal:  No edema. No obvious deformity No wasting       Neurological:  Alert and oriented to person, place, and time.   Coordination normal.     Skin:   Skin is warm and dry.  No diaphoresis.   No rash noted.     Psychiatric: Normal mood and affect. Behavior is normal.  Judgment and thought content normal.     Complete Blood Count  Lab Results   Component Value Date    RBC 4.14 10/12/2021    HGB 12.6 10/12/2021    HCT 38.7 10/12/2021    MCV 94 10/12/2021    MCH 30.4 10/12/2021    MCHC 32.6 10/12/2021    RDW 13.6 10/12/2021     10/12/2021    MPV 10.0 10/12/2021    GRAN 10.5 (H) 10/12/2021    GRAN 88.3 (H) 10/12/2021    LYMPH 0.9 (L) 10/12/2021    " LYMPH 7.3 (L) 10/12/2021    MONO 0.5 10/12/2021    MONO 3.9 (L) 10/12/2021    EOS 0.0 10/12/2021    BASO 0.00 10/12/2021    EOSINOPHIL 0.0 10/12/2021    BASOPHIL 0.0 10/12/2021    DIFFMETHOD Automated 10/12/2021       Comprehensive Metabolic Panel  No results found for: GLU, BUN, CREATININE, NA, K, CL, PROT, ALBUMIN, BILITOT, AST, ALKPHOS, CO2, ALT, ANIONGAP, EGFRNONAA, ESTGFRAFRICA    TSH  No results found for: TSH    Assessment / Plan:      ICD-10-CM ICD-9-CM   1. Essential hypertension  I10 401.9   2. Need for pneumococcal vaccination  Z23 V03.82   3. Need for influenza vaccination  Z23 V04.81     Essential hypertension    Need for pneumococcal vaccination  -     (In Office Administered) Pneumococcal Conjugate Vaccine (20 Valent) (IM)    Need for influenza vaccination  -     Influenza (FLUAD) - Quadrivalent (Adjuvanted) *Preferred* (65+) (PF)    Other orders  -     celecoxib (CELEBREX) 200 MG capsule; Take 1 capsule (200 mg total) by mouth once daily.  Dispense: 90 capsule; Refill: 1  -     colchicine (COLCRYS) 0.6 mg tablet; Take 1 tablet (0.6 mg total) by mouth once daily.  Dispense: 90 tablet; Refill: 1  -     nebivoloL (BYSTOLIC) 2.5 MG Tab; Take 1 tablet (2.5 mg total) by mouth once daily.  Dispense: 90 tablet; Refill: 1    Arthritis-Celebrex  Hypertension on Norvasc 10 milligrams-can add beta-blocker because of low heart rate-try Bystolic which should not affect heart rate significantly but too expensive   Will consider ARB.

## 2023-04-11 ENCOUNTER — TELEPHONE (OUTPATIENT)
Dept: FAMILY MEDICINE | Facility: CLINIC | Age: 78
End: 2023-04-11

## 2023-04-11 ENCOUNTER — CLINICAL SUPPORT (OUTPATIENT)
Dept: FAMILY MEDICINE | Facility: CLINIC | Age: 78
End: 2023-04-11
Payer: MEDICARE

## 2023-04-11 VITALS — OXYGEN SATURATION: 98 % | DIASTOLIC BLOOD PRESSURE: 84 MMHG | HEART RATE: 64 BPM | SYSTOLIC BLOOD PRESSURE: 152 MMHG

## 2023-04-11 RX ORDER — VALSARTAN 160 MG/1
160 TABLET ORAL DAILY
Qty: 90 TABLET | Refills: 3 | Status: SHIPPED | OUTPATIENT
Start: 2023-04-11 | End: 2024-04-10

## 2023-04-11 NOTE — TELEPHONE ENCOUNTER
Pt notified of increase dose of valsartan. Pt is aware of bp check in 2 weeks    Francia PURVIS [facility] Staff  Caller: pt (Today,  5:05 PM)  Type:  Patient Returning Call     Who Called:pt   Who Left Message for Patient:Nehal   Does the patient know what this is regarding?:no   Would the patient rather a call back or a response via [facility]? call   Best Call Back Number:[phone]   Additional Information:

## 2023-04-11 NOTE — TELEPHONE ENCOUNTER
Increase from 80 mg to 160 mg      a new RX and take when the 80 s run out  Rtc 2 weeks to check bp

## 2023-04-11 NOTE — TELEPHONE ENCOUNTER
1st reading (at 9:13 am) was 150/82     2nd reading (at 9:35 am) was 152/84    Bp meds were taken at 8:20 am.    Please advise.    Pt scheduled for bp check in 2 weeks.

## 2023-04-11 NOTE — PROGRESS NOTES
Yajaira Garcia 77 y.o. female is here today for Blood Pressure check.   History of HTN yes.    Review of patient's allergies indicates:  No Known Allergies  Creatinine   Date Value Ref Range Status   10/12/2021 0.89 0.50 - 1.40 mg/dL Final     Sodium   Date Value Ref Range Status   10/12/2021 141 136 - 145 mmol/L Final     Potassium   Date Value Ref Range Status   10/12/2021 4.9 3.5 - 5.1 mmol/L Final   ]  Patient verifies taking blood pressure medications on a regular basis at the same time of the day.     Current Outpatient Medications:     amLODIPine (NORVASC) 10 MG tablet, Take 1 tablet (10 mg total) by mouth once daily., Disp: 90 tablet, Rfl: 3    calcium carbonate 500 mg/5 mL (1,250 mg/5 mL), Take 500 mg by mouth once., Disp: , Rfl:     celecoxib (CELEBREX) 200 MG capsule, Take 1 capsule (200 mg total) by mouth once daily., Disp: 90 capsule, Rfl: 1    colchicine (COLCRYS) 0.6 mg tablet, Take 1 tablet (0.6 mg total) by mouth once daily., Disp: 90 tablet, Rfl: 1    hydrocodone-acetaminophen 5-325mg (NORCO) 5-325 mg per tablet, Take 1 tablet by mouth every 4 (four) hours as needed., Disp: 18 tablet, Rfl: 0    ibuprofen (ADVIL,MOTRIN) 800 MG tablet, Take 800 mg by mouth 3 (three) times daily., Disp: , Rfl:     multivitamin with minerals tablet, Take 2 tablets by mouth once daily., Disp: , Rfl:     nebivoloL (BYSTOLIC) 2.5 MG Tab, Take 1 tablet (2.5 mg total) by mouth once daily., Disp: 90 tablet, Rfl: 1    predniSONE (DELTASONE) 20 MG tablet, Take 20 mg by mouth 2 (two) times daily., Disp: , Rfl:     valsartan (DIOVAN) 80 MG tablet, Take 1 tablet (80 mg total) by mouth once daily. Dc bystolic, Disp: 90 tablet, Rfl: 3  Does patient have record of home blood pressure readings no.    Last dose of blood pressure medication was taken at 8:20 am.  Patient is asymptomatic.     BP: (!) 150/82 , Pulse: 64.    Blood pressure reading after 15 minutes was 152/84, Pulse 64.    Dr. Portillo notified.

## 2023-04-20 ENCOUNTER — PES CALL (OUTPATIENT)
Dept: ADMINISTRATIVE | Facility: CLINIC | Age: 78
End: 2023-04-20
Payer: MEDICARE

## 2023-04-25 ENCOUNTER — TELEPHONE (OUTPATIENT)
Dept: FAMILY MEDICINE | Facility: CLINIC | Age: 78
End: 2023-04-25

## 2023-04-25 ENCOUNTER — CLINICAL SUPPORT (OUTPATIENT)
Dept: FAMILY MEDICINE | Facility: CLINIC | Age: 78
End: 2023-04-25
Payer: MEDICARE

## 2023-04-25 VITALS — SYSTOLIC BLOOD PRESSURE: 132 MMHG | HEART RATE: 60 BPM | DIASTOLIC BLOOD PRESSURE: 72 MMHG | OXYGEN SATURATION: 99 %

## 2023-04-25 NOTE — TELEPHONE ENCOUNTER
Pt came in for a BP check:     BP:162/78 , Pulse: 60 .    Blood pressure reading after 15 minutes was 132/72, Pulse 60.  Dr. Rodriguez notified.

## 2023-04-25 NOTE — PROGRESS NOTES
Yajaira Garcia 77 y.o. female is here today for Blood Pressure check.   History of HTN yes.    Review of patient's allergies indicates:  No Known Allergies  Creatinine   Date Value Ref Range Status   10/12/2021 0.89 0.50 - 1.40 mg/dL Final     Sodium   Date Value Ref Range Status   10/12/2021 141 136 - 145 mmol/L Final     Potassium   Date Value Ref Range Status   10/12/2021 4.9 3.5 - 5.1 mmol/L Final   ]  Patient verifies taking blood pressure medications on a regular basis at the same time of the day.     Current Outpatient Medications:     amLODIPine (NORVASC) 10 MG tablet, Take 1 tablet (10 mg total) by mouth once daily., Disp: 90 tablet, Rfl: 3    calcium carbonate 500 mg/5 mL (1,250 mg/5 mL), Take 500 mg by mouth once., Disp: , Rfl:     celecoxib (CELEBREX) 200 MG capsule, Take 1 capsule (200 mg total) by mouth once daily., Disp: 90 capsule, Rfl: 1    colchicine (COLCRYS) 0.6 mg tablet, Take 1 tablet (0.6 mg total) by mouth once daily., Disp: 90 tablet, Rfl: 1    hydrocodone-acetaminophen 5-325mg (NORCO) 5-325 mg per tablet, Take 1 tablet by mouth every 4 (four) hours as needed., Disp: 18 tablet, Rfl: 0    ibuprofen (ADVIL,MOTRIN) 800 MG tablet, Take 800 mg by mouth 3 (three) times daily., Disp: , Rfl:     multivitamin with minerals tablet, Take 2 tablets by mouth once daily., Disp: , Rfl:     nebivoloL (BYSTOLIC) 2.5 MG Tab, Take 1 tablet (2.5 mg total) by mouth once daily., Disp: 90 tablet, Rfl: 1    predniSONE (DELTASONE) 20 MG tablet, Take 20 mg by mouth 2 (two) times daily., Disp: , Rfl:     valsartan (DIOVAN) 160 MG tablet, Take 1 tablet (160 mg total) by mouth once daily. Dc bystolic, Disp: 90 tablet, Rfl: 3  Does patient have record of home blood pressure readings no.   Last dose of blood pressure medication was taken at 0730.  Patient is asymptomatic.   Complains of N/A.    BP:162/78 , Pulse: 60 .    Blood pressure reading after 15 minutes was 132/72, Pulse 60.  Dr. Rodriguez notified.

## 2023-07-18 ENCOUNTER — PES CALL (OUTPATIENT)
Dept: ADMINISTRATIVE | Facility: CLINIC | Age: 78
End: 2023-07-18
Payer: MEDICARE

## 2023-08-03 NOTE — PROGRESS NOTES
Increase Blood pressure medication Diovan from 80 milligrams to 160 milligrams.    So for the time I suggest picking up the prescription for 160 milligrams.  Then use the 80 milligram tablets you have at home by taking two of them come back and recheck blood pressure in two weeks.  
0

## 2023-10-24 ENCOUNTER — OFFICE VISIT (OUTPATIENT)
Dept: HOME HEALTH SERVICES | Facility: CLINIC | Age: 78
End: 2023-10-24
Payer: MEDICARE

## 2023-10-24 VITALS
WEIGHT: 132 LBS | OXYGEN SATURATION: 98 % | BODY MASS INDEX: 24.14 KG/M2 | SYSTOLIC BLOOD PRESSURE: 184 MMHG | DIASTOLIC BLOOD PRESSURE: 92 MMHG | HEART RATE: 53 BPM

## 2023-10-24 DIAGNOSIS — I10 ESSENTIAL HYPERTENSION: ICD-10-CM

## 2023-10-24 DIAGNOSIS — Z00.00 ENCOUNTER FOR PREVENTIVE HEALTH EXAMINATION: Primary | ICD-10-CM

## 2023-10-24 DIAGNOSIS — M85.80 OSTEOPENIA, UNSPECIFIED LOCATION: ICD-10-CM

## 2023-10-24 DIAGNOSIS — M19.90 OSTEOARTHRITIS, UNSPECIFIED OSTEOARTHRITIS TYPE, UNSPECIFIED SITE: ICD-10-CM

## 2023-10-24 PROBLEM — Z96.643 S/P BILATERAL HIP REPLACEMENTS: Status: ACTIVE | Noted: 2023-10-24

## 2023-10-24 PROCEDURE — G0439 PPPS, SUBSEQ VISIT: HCPCS | Mod: S$GLB,,, | Performed by: NURSE PRACTITIONER

## 2023-10-24 PROCEDURE — G0439 PR MEDICARE ANNUAL WELLNESS SUBSEQUENT VISIT: ICD-10-PCS | Mod: S$GLB,,, | Performed by: NURSE PRACTITIONER

## 2023-10-24 NOTE — PROGRESS NOTES
Yajaira Garcia presented for a  Medicare AWV and comprehensive Health Risk Assessment today. The following components were reviewed and updated:    Medical history  Family History  Social history  Allergies and Current Medications  Health Risk Assessment  Health Maintenance  Care Team         ** See Completed Assessments for Annual Wellness Visit within the encounter summary.**         The following assessments were completed:  Living Situation  CAGE  Depression Screening  Timed Get Up and Go  Whisper Test  Nutrition Screening  ADL Screening  PAQ Screening    Review for Opioid Screening: Patient does not have rx for Opioids.  Review for Substance Use Disorders: Patient does not use substance.      Vitals:    10/24/23 1005   BP: (!) 184/92   Pulse: (!) 53   SpO2: 98%   Weight: 59.9 kg (132 lb)     Body mass index is 24.14 kg/m².  Physical Exam  Vitals reviewed.   HENT:      Head: Normocephalic.   Eyes:      Pupils: Pupils are equal, round, and reactive to light.   Cardiovascular:      Rate and Rhythm: Normal rate and regular rhythm.      Heart sounds: Normal heart sounds.   Pulmonary:      Effort: Pulmonary effort is normal.      Breath sounds: Normal breath sounds.   Abdominal:      General: Bowel sounds are normal.      Palpations: Abdomen is soft.   Musculoskeletal:         General: Normal range of motion.      Cervical back: Normal range of motion.      Right lower leg: No edema.      Left lower leg: No edema.   Skin:     General: Skin is warm and dry.   Neurological:      Mental Status: She is alert and oriented to person, place, and time.   Psychiatric:         Behavior: Behavior normal.               Diagnoses and health risks identified today and associated recommendations/orders:    1. Encounter for preventive health examination  - Above assessments completed. Preventive measures and health maintenance reviewed with patient.  -discussed overdue health maintenance  -advised/encouraged pt to f/u with PCP,  need updated labs.  -pt caring for  with newly diagnosed leukemia    2. Essential hypertension  Uncontrolled, followed by PCP  -on amlodipine and valsartan  -BP elevated today, pt reports compliance with taking medications, education provided re: importance of BP control  -asymptomatic, advised pt to keep daily bp log and make appt with PCP in 1 wk if persistent elevated readings  -discussed s/s that would warrant ED visit.    3. Osteoarthritis, unspecified osteoarthritis type, unspecified site  Stable, followed by PCP  -on celebrex    4. Osteopenia, unspecified location  Stable, followed by PCP  -encouraged Virgilio/D, weight bearing exercises      Provided Yajaira with a 5-10 year written screening schedule and personal prevention plan. Recommendations were developed using the USPSTF age appropriate recommendations. Education, counseling, and referrals were provided as needed. After Visit Summary printed and given to patient which includes a list of additional screenings\tests needed.    Follow up in about 1 year (around 10/24/2024) for your next annual wellness visit.    Louisa Kennedy NP    I offered to discuss advanced care planning, including how to pick a person who would make decisions for you if you were unable to make them for yourself, called a health care power of , and what kind of decisions you might make such as use of life sustaining treatments such as ventilators and tube feeding when faced with a life limiting illness recorded on a living will that they will need to know. (How you want to be cared for as you near the end of your natural life)     X Patient is interested in learning more about how to make advanced directives.  I provided them paperwork and offered to discuss this with them.

## 2023-10-24 NOTE — PATIENT INSTRUCTIONS
Counseling and Referral of Other Preventative  (Italic type indicates deductible and co-insurance are waived)    Patient Name: Yajaira Garcia  Today's Date: 10/24/2023    Health Maintenance       Date Due Completion Date    Hepatitis C Screening Never done ---    TETANUS VACCINE Never done ---    Shingles Vaccine (1 of 2) Never done ---    Lipid Panel 01/17/2023 1/17/2018    Influenza Vaccine (1) 09/01/2023 3/28/2023    COVID-19 Vaccine (4 - 2023-24 season) 09/01/2023 1/6/2022    DEXA Scan 10/27/2024 10/27/2021        No orders of the defined types were placed in this encounter.    The following information is provided to all patients.  This information is to help you find resources for any of the problems found today that may be affecting your health:                Living healthy guide: www.[facility].[place].[facility]      Understanding Diabetes: www.diabetes.org      Eating healthy: www.cdc.gov/healthyweight      [facility] home safety checklist: www.cdc.gov/steadi/patient.html      Agency on Aging: www.goea.[place].[facility]      Alcoholics anonymous (AA): www.aa.org      Physical Activity: www.amanda.nih.gov/bg6mayu      Tobacco use: www.quitwithusla.org

## 2023-11-10 ENCOUNTER — PATIENT MESSAGE (OUTPATIENT)
Dept: FAMILY MEDICINE | Facility: CLINIC | Age: 78
End: 2023-11-10
Payer: MEDICARE

## 2024-01-31 RX ORDER — AMLODIPINE BESYLATE 10 MG/1
10 TABLET ORAL DAILY
Qty: 90 TABLET | Refills: 0 | Status: SHIPPED | OUTPATIENT
Start: 2024-01-31

## 2024-01-31 NOTE — TELEPHONE ENCOUNTER
Care Due:                  Date            Visit Type   Department     Provider  --------------------------------------------------------------------------------                                EP -                              PRIMARY      [facility]  Last Visit: 03-      CARE ([facility])   MEDICINE       Wellington Rodriguez  Next Visit: None Scheduled  None         None Found                                                            Last  Test          Frequency    Reason                     Performed    Due Date  --------------------------------------------------------------------------------    Office Visit  12 months..  celecoxib................  03- 03-    CBC.........  12 months..  celecoxib................  Not Found    Overdue    CMP.........  12 months..  celecoxib, colchicine,     Not Found    Overdue                             valsartan................    Uric Acid...  12 months..  colchicine...............  Not Found    Overdue    [employer] Embedded Care Due Messages. Reference number: [id number].   1/31/2024 5:33:39 PM CST

## 2024-02-01 NOTE — TELEPHONE ENCOUNTER
Refill Routing Note   Medication(s) are not appropriate for processing by [facility] for the following reason(s):        Required vitals abnormal    [facility] action(s):  Defer   Requires appointment : Yes     Requires labs : Yes             Appointments  past 12m or future 3m with PCP    Date Provider   Last Visit   3/28/2023 Wellington Rodriguez MD   Next Visit   Visit date not found Wellington Rodriguez MD   ED visits in past 90 days: 0        Note composed:10:08 PM 01/31/2024

## 2024-04-18 ENCOUNTER — HOSPITAL ENCOUNTER (OUTPATIENT)
Dept: RADIOLOGY | Facility: HOSPITAL | Age: 79
Discharge: HOME | End: 2024-04-18
Attending: NURSE PRACTITIONER
Payer: MEDICARE

## 2024-04-18 DIAGNOSIS — M79.642 LEFT HAND PAIN: Primary | ICD-10-CM

## 2024-04-18 DIAGNOSIS — M25.539 WRIST PAIN: ICD-10-CM

## 2024-04-18 DIAGNOSIS — M79.642 LEFT HAND PAIN: ICD-10-CM

## 2024-04-18 PROCEDURE — 73130 X-RAY EXAM OF HAND: CPT | Mod: 26,LT,, | Performed by: RADIOLOGY

## 2024-04-18 PROCEDURE — 73110 X-RAY EXAM OF WRIST: CPT | Mod: TC,FY,PN,LT

## 2024-04-18 PROCEDURE — 73130 X-RAY EXAM OF HAND: CPT | Mod: TC,FY,PN,LT

## 2024-04-18 PROCEDURE — 73110 X-RAY EXAM OF WRIST: CPT | Mod: 26,LT,, | Performed by: RADIOLOGY

## 2024-04-26 ENCOUNTER — HOSPITAL ENCOUNTER (OUTPATIENT)
Dept: RADIOLOGY | Facility: HOSPITAL | Age: 79
Discharge: HOME | End: 2024-04-26
Attending: NURSE PRACTITIONER
Payer: MEDICARE

## 2024-04-26 DIAGNOSIS — Z78.0 MENOPAUSE: ICD-10-CM

## 2024-04-26 PROCEDURE — 77080 DXA BONE DENSITY AXIAL: CPT | Mod: 26,,, | Performed by: RADIOLOGY

## 2024-04-26 PROCEDURE — 77080 DXA BONE DENSITY AXIAL: CPT | Mod: TC,PN

## 2025-03-24 ENCOUNTER — TELEPHONE (OUTPATIENT)
Dept: FAMILY MEDICINE | Facility: CLINIC | Age: 80
End: 2025-03-24
Payer: MEDICARE

## 2025-03-24 DIAGNOSIS — Z01.818 PREOPERATIVE CLEARANCE: Primary | ICD-10-CM

## 2025-03-24 NOTE — TELEPHONE ENCOUNTER
She is a knee replacement with Dr. Mauricio on 04/01/25. She had stated that the ortho office attempted to reach us regarding the sx clearance needed but I did not see a message about it. Pt did not have any clearance paperwork besides stating that she needed an EKG before and that Labs from [facility] had been ordered.     Pt does not have the lab Results.     Pt can't be scheduled for EKG until after appt time. Did you want her to complete it after the appt or while she is here in the office?

## 2025-03-26 ENCOUNTER — HOSPITAL ENCOUNTER (OUTPATIENT)
Dept: CARDIOLOGY | Facility: HOSPITAL | Age: 80
Discharge: HOME | End: 2025-03-26
Attending: PHYSICIAN ASSISTANT
Payer: MEDICARE

## 2025-03-26 DIAGNOSIS — Z01.818 PREOPERATIVE CLEARANCE: ICD-10-CM

## 2025-03-26 LAB
OHS QRS DURATION: 74 MS
OHS QTC CALCULATION: 382 MS

## 2025-03-26 PROCEDURE — 93005 ELECTROCARDIOGRAM TRACING: CPT | Mod: PN

## 2025-03-26 PROCEDURE — 93010 ELECTROCARDIOGRAM REPORT: CPT | Mod: ,,, | Performed by: INTERNAL MEDICINE

## 2025-03-27 ENCOUNTER — OFFICE VISIT (OUTPATIENT)
Dept: FAMILY MEDICINE | Facility: CLINIC | Age: 80
End: 2025-03-27
Payer: MEDICARE

## 2025-03-27 ENCOUNTER — RESULTS FOLLOW-UP (OUTPATIENT)
Dept: FAMILY MEDICINE | Facility: CLINIC | Age: 80
End: 2025-03-27

## 2025-03-27 ENCOUNTER — LAB VISIT (OUTPATIENT)
Dept: LAB | Facility: HOSPITAL | Age: 80
End: 2025-03-27
Attending: PHYSICIAN ASSISTANT
Payer: MEDICARE

## 2025-03-27 VITALS
BODY MASS INDEX: 27.02 KG/M2 | HEIGHT: 62 IN | DIASTOLIC BLOOD PRESSURE: 82 MMHG | TEMPERATURE: 98 F | HEART RATE: 67 BPM | SYSTOLIC BLOOD PRESSURE: 138 MMHG | WEIGHT: 146.81 LBS | OXYGEN SATURATION: 99 %

## 2025-03-27 DIAGNOSIS — Z01.818 PREOPERATIVE CLEARANCE: ICD-10-CM

## 2025-03-27 DIAGNOSIS — Z01.818 PREOPERATIVE CLEARANCE: Primary | ICD-10-CM

## 2025-03-27 DIAGNOSIS — E87.5 HYPERKALEMIA: ICD-10-CM

## 2025-03-27 DIAGNOSIS — M19.90 OSTEOARTHRITIS, UNSPECIFIED OSTEOARTHRITIS TYPE, UNSPECIFIED SITE: ICD-10-CM

## 2025-03-27 DIAGNOSIS — I10 ESSENTIAL HYPERTENSION: ICD-10-CM

## 2025-03-27 LAB
ANION GAP (OHS): 8 MMOL/L (ref 8–16)
BUN SERPL-MCNC: 20 MG/DL (ref 7–17)
CALCIUM SERPL-MCNC: 10.3 MG/DL (ref 8.7–10.5)
CHLORIDE SERPL-SCNC: 104 MMOL/L (ref 95–110)
CO2 SERPL-SCNC: 26 MMOL/L (ref 23–29)
CREAT SERPL-MCNC: 1.1 MG/DL (ref 0.5–1.4)
GFR SERPLBLD CREATININE-BSD FMLA CKD-EPI: 52 ML/MIN/1.73/M2
GLUCOSE SERPL-MCNC: 90 MG/DL (ref 70–110)
POTASSIUM SERPL-SCNC: 6.2 MMOL/L (ref 3.5–5.1)
SODIUM SERPL-SCNC: 138 MMOL/L (ref 136–145)

## 2025-03-27 PROCEDURE — 82310 ASSAY OF CALCIUM: CPT | Mod: PN

## 2025-03-27 PROCEDURE — 36415 COLL VENOUS BLD VENIPUNCTURE: CPT | Mod: PN

## 2025-03-27 PROCEDURE — 99214 OFFICE O/P EST MOD 30 MIN: CPT | Mod: S$GLB,,, | Performed by: PHYSICIAN ASSISTANT

## 2025-03-27 RX ORDER — CELECOXIB 200 MG/1
200 CAPSULE ORAL DAILY
Qty: 90 CAPSULE | Refills: 3 | Status: SHIPPED | OUTPATIENT
Start: 2025-03-27

## 2025-03-27 RX ORDER — VALSARTAN 160 MG/1
160 TABLET ORAL DAILY
Qty: 90 TABLET | Refills: 3 | Status: SHIPPED | OUTPATIENT
Start: 2025-03-27 | End: 2026-03-27

## 2025-03-27 RX ORDER — FUROSEMIDE 20 MG/1
20 TABLET ORAL 2 TIMES DAILY
Qty: 4 TABLET | Refills: 0 | Status: SHIPPED | OUTPATIENT
Start: 2025-03-27 | End: 2025-03-29

## 2025-03-28 ENCOUNTER — TELEPHONE (OUTPATIENT)
Dept: FAMILY MEDICINE | Facility: CLINIC | Age: 80
End: 2025-03-28
Payer: MEDICARE

## 2025-03-28 ENCOUNTER — RESULTS FOLLOW-UP (OUTPATIENT)
Dept: FAMILY MEDICINE | Facility: CLINIC | Age: 80
End: 2025-03-28

## 2025-03-28 ENCOUNTER — LAB VISIT (OUTPATIENT)
Dept: LAB | Facility: HOSPITAL | Age: 80
End: 2025-03-28
Attending: PHYSICIAN ASSISTANT
Payer: MEDICARE

## 2025-03-28 DIAGNOSIS — Z01.818 PREOPERATIVE CLEARANCE: ICD-10-CM

## 2025-03-28 DIAGNOSIS — E87.5 HYPERKALEMIA: ICD-10-CM

## 2025-03-28 LAB
ANION GAP (OHS): 10 MMOL/L (ref 8–16)
BUN SERPL-MCNC: 28 MG/DL (ref 7–17)
CALCIUM SERPL-MCNC: 9.6 MG/DL (ref 8.7–10.5)
CHLORIDE SERPL-SCNC: 105 MMOL/L (ref 95–110)
CO2 SERPL-SCNC: 24 MMOL/L (ref 23–29)
CREAT SERPL-MCNC: 1.4 MG/DL (ref 0.5–1.4)
GFR SERPLBLD CREATININE-BSD FMLA CKD-EPI: 39 ML/MIN/1.73/M2
GLUCOSE SERPL-MCNC: 131 MG/DL (ref 70–110)
POTASSIUM SERPL-SCNC: 5.1 MMOL/L (ref 3.5–5.1)
SODIUM SERPL-SCNC: 139 MMOL/L (ref 136–145)

## 2025-03-28 PROCEDURE — 80048 BASIC METABOLIC PNL TOTAL CA: CPT | Mod: PN

## 2025-03-28 PROCEDURE — 36415 COLL VENOUS BLD VENIPUNCTURE: CPT | Mod: PN

## 2025-03-28 RX ORDER — AMLODIPINE BESYLATE 10 MG/1
10 TABLET ORAL DAILY
Qty: 90 TABLET | Refills: 3 | Status: SHIPPED | OUTPATIENT
Start: 2025-03-28

## 2025-03-28 NOTE — TELEPHONE ENCOUNTER
----- Message from Daron sent at 3/28/2025  3:10 PM CDT -----  Regarding: pt  Name of Who is Calling:Pt What is the request in detail: Requesting callback in regards to labs why does she need to retake testCan the clinic reply by [facility]: no What Number to Call Back if not in [employer]:Telephone Information:[employer]          [phone]

## 2025-03-31 ENCOUNTER — TELEPHONE (OUTPATIENT)
Dept: FAMILY MEDICINE | Facility: CLINIC | Age: 80
End: 2025-03-31
Payer: MEDICARE

## 2025-03-31 NOTE — TELEPHONE ENCOUNTER
----- Message from Althea sent at 3/31/2025  1:06 PM CDT -----  Type:  Needs Medical AdviceWho Called: Mauro Call Back Number: [phone] Information: Patient is calling to let the office know that she has scheduled a cardio appt with Dr. Vargas for tomorrow at 4:20 due to her EKG results.

## 2025-03-31 NOTE — PROGRESS NOTES
" Patient ID: Yajaira Garcia is a 79 y.o. female.     Chief Complaint: Pre-op Exam (4/1/25 /knee replacement/Dr. Dent)    Ms. Garcia is a 79 year old female with history of HTN and OA who presents today for pre-operative evaluation for knee surgery. She is scheduled for knee surgery on April 1st with Dr. Dent at the surgery center in [place]. She had preop labs done via [facility] and recently had EKG.     Lab work completed at [facility] showed low potassium levels. She denies taking potassium supplements and is unsure about potassium-rich foods in her diet.    She reports feeling good overall. She denies illness, fever, chest pain, shortness of breath, or changes to urination. Overall, well appearing without further complaints.        Review of Systems  Review of Systems   Constitutional:  Negative for fever.   HENT:  Negative for ear pain and sinus pain.    Eyes:  Negative for discharge.   Respiratory:  Negative for cough and wheezing.    Cardiovascular:  Negative for chest pain and leg swelling.   Gastrointestinal:  Negative for diarrhea, nausea and vomiting.   Genitourinary:  Negative for urgency.   Musculoskeletal:  Negative for myalgias.   Skin:  Negative for rash.   Neurological:  Negative for weakness and headaches.   Psychiatric/Behavioral:  Negative for depression.        Currently Medications  Medications Ordered Prior to Encounter[1]    Physical  Exam  Vitals:    03/27/25 1043   BP: 138/82   BP Location: Left arm   Patient Position: Sitting   Pulse: 67   Temp: 98 °F (36.7 °C)   SpO2: 99%   Weight: 66.6 kg (146 lb 13.2 oz)   Height: 5' 2" (1.575 m)      Body mass index is 26.85 kg/m².  Wt Readings from Last 3 Encounters:   03/27/25 66.6 kg (146 lb 13.2 oz)   10/24/23 59.9 kg (132 lb)   03/28/23 66.2 kg (145 lb 15.1 oz)       Physical Exam  Vitals and nursing note reviewed.   Constitutional:       General: She is not in acute distress.     Appearance: She is not ill-appearing.   HENT:      Head: Normocephalic and " "atraumatic.      Right Ear: External ear normal.      Left Ear: External ear normal.      Nose: Nose normal.      Mouth/Throat:      Mouth: Mucous membranes are moist.   Eyes:      Extraocular Movements: Extraocular movements intact.      Conjunctiva/sclera: Conjunctivae normal.   Cardiovascular:      Rate and Rhythm: Normal rate and regular rhythm.      Pulses: Normal pulses.      Heart sounds: No murmur heard.  Pulmonary:      Effort: Pulmonary effort is normal. No respiratory distress.      Breath sounds: No wheezing.   Abdominal:      General: There is no distension.      Palpations: Abdomen is soft. There is no mass.      Tenderness: There is no abdominal tenderness.   Musculoskeletal:         General: No swelling.      Cervical back: Normal range of motion.   Skin:     Coloration: Skin is not jaundiced.      Findings: No rash.   Neurological:      General: No focal deficit present.      Mental Status: She is alert and oriented to person, place, and time.   Psychiatric:         Mood and Affect: Mood normal.         Thought Content: Thought content normal.         Labs:    Complete Blood Count  Lab Results   Component Value Date    RBC 4.14 10/12/2021    HGB 12.6 10/12/2021    HCT 38.7 10/12/2021    MCV 94 10/12/2021    MCH 30.4 10/12/2021    MCHC 32.6 10/12/2021    RDW 13.6 10/12/2021     10/12/2021    MPV 10.0 10/12/2021    GRAN 10.5 (H) 10/12/2021    GRAN 88.3 (H) 10/12/2021    LYMPH 0.9 (L) 10/12/2021    LYMPH 7.3 (L) 10/12/2021    MONO 0.5 10/12/2021    MONO 3.9 (L) 10/12/2021    EOS 0.0 10/12/2021    BASO 0.00 10/12/2021    EOSINOPHIL 0.0 10/12/2021    BASOPHIL 0.0 10/12/2021    DIFFMETHOD Automated 10/12/2021       Comprehensive Metabolic Panel  Lab Results   Component Value Date    BUN 28 (H) 03/28/2025    CREATININE 1.4 03/28/2025     03/28/2025    K 5.1 03/28/2025     03/28/2025    CO2 24 03/28/2025    ANIONGAP 10 03/28/2025       TSH  No results found for: "TSH"    Imaging:  DXA " Bone Density Axial Skeleton 1 or more sites  Narrative: EXAMINATION:  DXA BONE DENSITY AXIAL SKELETON 1 OR MORE SITES    CLINICAL HISTORY:  Asymptomatic menopausal state    TECHNIQUE:  Bone Mineral Density performed using [employer] [place] (S/N [id number]) reveals good positioning of lumbar spine and hip.    COMPARISON:  04/26/2024    FINDINGS:  A quantitative bone mineral density was obtained using the DEXA technique.  The bone mineral density measured from right forearm measures 0.383 g/cm2.  This corresponds to a T score of -3.4 and a Z score of -0.9.  Impression: Osteoporosis.  Fracture risk is moderate.    RECOMMENDATIONS:    1) Adequate calcium and Vitamin D therapy    2) Appropriate exercise    3) Consider repeat BMD in 1 year.    EXPLANATION OF RESULTS:    The t-score compares this results to the bone density of a 25 year old of the same gender. The z-score compares this result to the average bone density to people of the same age and gender. The amounts indicate the number of standard deviations above or    below the mean.    * Osteoporosis is generally defined as having a t-score between less than -2.5.    * Osteopenia is generally defined as having a t-score between -1 and -2.5.    Electronically signed by: Lb Lovett MD  Date:    04/26/2024  Time:    11:11      Assessment/Plan:    1. Preoperative clearance  Comments:  - Potassium initially elevated at 6.2, corrected with lasix to 5.1  - CLEARED for surgery under gen anesthesia at moderate risk  Orders:  -     furosemide (LASIX) 20 MG tablet; Take 1 tablet (20 mg total) by mouth 2 (two) times daily. for 2 days  Dispense: 4 tablet; Refill: 0  -     Basic Metabolic Panel; Future  -     SCHEDULED EKG 12-LEAD (to [facility]); Future    2. Osteoarthritis, unspecified osteoarthritis type, unspecified site  -     celecoxib (CELEBREX) 200 MG capsule; Take 1 capsule (200 mg total) by mouth once daily.  Dispense: 90 capsule; Refill: 3  -     SCHEDULED EKG 12-LEAD (to [facility]);  Future    3. Essential hypertension  -     valsartan (DIOVAN) 160 MG tablet; Take 1 tablet (160 mg total) by mouth once daily. Dc bystolic  Dispense: 90 tablet; Refill: 3  -     amLODIPine (NORVASC) 10 MG tablet; Take 1 tablet (10 mg total) by mouth once daily.  Dispense: 90 tablet; Refill: 3    4. Hyperkalemia  -     furosemide (LASIX) 20 MG tablet; Take 1 tablet (20 mg total) by mouth 2 (two) times daily. for 2 days  Dispense: 4 tablet; Refill: 0  -     Basic Metabolic Panel; Future       Assessment & Plan    E87.6 Hyperkalemia    IMPRESSION:  - Reviewed lab work, noting low potassium levels.  - Determined need for repeat potassium test to confirm results and rule out potential lab error.  - Assessed overall health status, including recent EKG results which were normal.  - Will review potassium test results and send all gathered information to Dr. Dent for upcoming knee surgery on April 1st.    HYPERKALEMIA:  - Reviewed previous lab work from [facility] and identified low potassium levels.  - Ordered a repeat potassium blood test at a local lab ([facility]) to confirm the low reading.  - Inquired about the patient's intake of potassium supplements and potassium-rich foods.    FOLLOW-UP:  - Instructed the patient that no news is good news regarding test results, but will call if results are abnormal or require further action.  - Plan to review the new potassium results and communicate with the patient if any issues arise.          Discussed how to stay healthy including: diet, exercise, refraining from smoking and discussed screening exams / tests needed for age, sex and family Hx.    This note was generated with the assistance of ambient listening technology. Verbal consent was obtained by the patient and accompanying visitor(s) for the recording of patient appointment to facilitate this note. I attest to having reviewed and edited the generated note for accuracy, though some syntax or spelling errors may persist. Please  contact the author of this note for any clarification.       RTC every 3-6 months with PCP, or PRN    Bernice York PA-C             [1]   Current Outpatient Medications on File Prior to Visit   Medication Sig Dispense Refill    calcium carbonate 500 mg/5 mL (1,250 mg/5 mL) Take 500 mg by mouth once.      colchicine (COLCRYS) 0.6 mg tablet Take 1 tablet (0.6 mg total) by mouth once daily. 90 tablet 1    ibuprofen (ADVIL,MOTRIN) 800 MG tablet Take 800 mg by mouth 3 (three) times daily.      multivitamin with minerals tablet Take 2 tablets by mouth once daily.       No current facility-administered medications on file prior to visit.

## 2025-04-01 ENCOUNTER — OFFICE VISIT (OUTPATIENT)
Dept: CARDIOLOGY | Facility: CLINIC | Age: 80
End: 2025-04-01
Payer: MEDICARE

## 2025-04-01 VITALS
SYSTOLIC BLOOD PRESSURE: 138 MMHG | OXYGEN SATURATION: 99 % | BODY MASS INDEX: 26.17 KG/M2 | DIASTOLIC BLOOD PRESSURE: 85 MMHG | WEIGHT: 143.06 LBS | HEART RATE: 70 BPM

## 2025-04-01 DIAGNOSIS — R94.31 NONSPECIFIC ABNORMAL ELECTROCARDIOGRAM (ECG) (EKG): Primary | ICD-10-CM

## 2025-04-01 DIAGNOSIS — Z01.818 PREOPERATIVE CLEARANCE: ICD-10-CM

## 2025-04-01 DIAGNOSIS — I10 ESSENTIAL HYPERTENSION: ICD-10-CM

## 2025-04-01 PROCEDURE — 99999 PR PBB SHADOW E&M-EST. PATIENT-LVL III: CPT | Mod: PBBFAC,,, | Performed by: STUDENT IN AN ORGANIZED HEALTH CARE EDUCATION/TRAINING PROGRAM

## 2025-04-01 PROCEDURE — 99213 OFFICE O/P EST LOW 20 MIN: CPT | Mod: PBBFAC,PN | Performed by: STUDENT IN AN ORGANIZED HEALTH CARE EDUCATION/TRAINING PROGRAM

## 2025-04-01 NOTE — PROGRESS NOTES
Cardiology Clinic Visit    History of Present Illness:       Yajaira Garcia is a pleasant 79 y.o. female with PMHx of HTN, TIA here for abnormal ecg. She voiced no CV complaints. METS>4. She can proceed with Knee replacement. Compliant with meds w/o s/e. ECG appears to be lead placement compared to previous ECGs. Denies cp, sob, palpitations, presyncope/dizziness, syncope, orthopnea, PND, bendopnea, decrease ET, NVDC, fever, chills.    ECG NSR, septal age undetermined.     History obtained by patient interview and supplemented by nursing documentation and chart review.   PMHx:  has a past medical history of Acute posthemorrhagic anemia (12/28/2016), Arthritis, Degenerative joint disease (DJD) of hip (12/27/2016), Hypertension, and Stroke (10/2015).   SurgHx:  has a past surgical history that includes Carpal tunnel release (Bilateral, 1998); Kidney stone surgery (Right); Tubal ligation; Gastric Sleeve (08/2016); Hiatal hernia repair (08/2016); Hip surgery (Right, 12/27/2017); Hip surgery (Left, 12/2021); and Eye surgery (Left, 2022).   FamHx: family history includes Cancer in her father; Dementia in her mother; No Known Problems in her brother.   SocialHx:  reports that she has never smoked. She has never been exposed to tobacco smoke. She has never used smokeless tobacco. She reports that she does not drink alcohol.  Home Meds:  Current Outpatient Medications   Medication Instructions    amLODIPine (NORVASC) 10 mg, Oral, Daily    calcium carbonate 500 mg, Once    celecoxib (CELEBREX) 200 mg, Oral, Daily    colchicine (COLCRYS) 0.6 mg, Oral, Daily    furosemide (LASIX) 20 mg, Oral, 2 times daily    ibuprofen (ADVIL,MOTRIN) 800 mg, 3 times daily    multivitamin with minerals tablet 2 tablets, Daily    valsartan (DIOVAN) 160 mg, Oral, Daily, Dc bystolic       Review of Systems: Comprehensive ROS was performed and is negative unless otherwise noted in HPI.   Objective   Objective/Exam:   /85 (BP Location: Right  "arm, Patient Position: Sitting)   Pulse 70   Wt 64.9 kg (143 lb 1.3 oz)   SpO2 99%   BMI 26.17 kg/m²    Wt Readings from Last 4 Encounters:   04/01/25 64.9 kg (143 lb 1.3 oz)   03/27/25 66.6 kg (146 lb 13.2 oz)   10/24/23 59.9 kg (132 lb)   03/28/23 66.2 kg (145 lb 15.1 oz)      Constitutional: NAD, Atraumatic, Conversant   HEENT: MMM, Sclera anicteric, No JVD   Cardiovascular: RRR, no murmurs noted, no chest tenderness to palpation, 2+ radial pulses b/l  Pulmonary: normal respiratory effort, CTAB, no crackles, wheezes  Abdominal: S/NT/ND  Musculoskeletal: No lower extremity edema noted b/l  Neurological: No gross neurological deficits  Skin: W/D/I  Psych: Appropriate affect, normal mood  Labs/Imaging/Procedures   Personally reviewed  Lab Results   Component Value Date     03/28/2025     10/12/2021    K 5.1 03/28/2025    K 4.9 10/12/2021     03/28/2025     10/12/2021    CO2 24 03/28/2025    CO2 26 10/12/2021    BUN 28 (H) 03/28/2025    CREATININE 1.4 03/28/2025    GLUCOSE 131 (H) 03/28/2025    ANIONGAP 10 03/28/2025     No results found for: "HGBA1C"  No results found for: "BNP", "BNPTRIAGEBLO"   Lab Results   Component Value Date    WBC 11.86 10/12/2021    HGB 12.6 10/12/2021    HCT 38.7 10/12/2021     10/12/2021    GRAN 10.5 (H) 10/12/2021    GRAN 88.3 (H) 10/12/2021     Lab Results   Component Value Date    CHOL 190 01/17/2018    HDL 64 01/17/2018    LDLCALC 110 (H) 01/17/2018    TRIG 74 01/17/2018     Lab Results   Component Value Date    TSH 0.55 01/17/2018     No results found for: "APOLIPOPROTE"  No results found for: "LIPOA"     TTE:  No results found for this or any previous visit.    Stress Testing:   No results found for this or any previous visit.     Coronary Angiogram:  No results found for this or any previous visit.      -Reviewing Medical records & lab results  -Independently reviewing and interpreting (if not documented by myself) EKGs, echocardiograms, " catherizations   -Obtaining a history, performing a relevant exam, counseling/educating the patient/family  -Documenting clinical information in the EMR including ordering of tests  -Care coordination and communicating with other health care providers       Problem List:     1. Nonspecific abnormal electrocardiogram (ECG) (EKG)    2. Essential hypertension    3. Preoperative clearance      Assessment/Plan:   Preoperative risk assessment-- METS>4. Pt has no active cardiac condition (ACS/USA, decompenstated CHF, significant arrhythmias or severe valvular disease). METS >4. Patient has acceptable risk for low risk non cardiovascular surgery. No further cardiac work up required prior to  undergoing the surgical procedure.  These recommendations follow the 2014 ACC/AHA Guideline on Perioperative Cardiovascular Evaluation and Management of Patients Undergoing Noncardiac Surgery. (JACC Vol. 64 No. 22, Dec 9, 2014, pp p29-u547).  HTN continue BP meds. BP at goal. Will do echo for completion. Proceed with surgery.       Patient expressed verbal understanding and agreed with the plan     Return sooner for concerns or questions. If symptoms persist go to the ED.  I have reviewed all pertinent data including patient's medical history in detail and updated the computerized patient record.     Total time spent counseling greater than fifty percent of total visit time.  Counseling included discussion regarding imaging findings, diagnosis, possibilities, treatment options, risks and benefits.      Thank you for the opportunity to care for this patient. Please don't hesitate to reach out with any questions/concerns         Irvin Vargas MD  Cardiovascular Disease; Interventional Cardiology  [facility]

## 2025-04-07 ENCOUNTER — HOSPITAL ENCOUNTER (OUTPATIENT)
Dept: CARDIOLOGY | Facility: HOSPITAL | Age: 80
Discharge: HOME | End: 2025-04-07
Attending: STUDENT IN AN ORGANIZED HEALTH CARE EDUCATION/TRAINING PROGRAM
Payer: MEDICARE

## 2025-04-07 VITALS — HEIGHT: 62 IN | BODY MASS INDEX: 26.31 KG/M2 | WEIGHT: 143 LBS

## 2025-04-07 DIAGNOSIS — R94.31 NONSPECIFIC ABNORMAL ELECTROCARDIOGRAM (ECG) (EKG): ICD-10-CM

## 2025-04-07 LAB
APICAL FOUR CHAMBER EJECTION FRACTION: 70 %
APICAL TWO CHAMBER EJECTION FRACTION: 64 %
ASCENDING AORTA: 2.5 CM
AV INDEX (PROSTH): 0.39
AV MEAN GRADIENT: 15 MMHG
AV PEAK GRADIENT: 27 MMHG
AV REGURGITATION PRESSURE HALF TIME: 503 MS
AV VALVE AREA BY VELOCITY RATIO: 1.1 CM²
AV VALVE AREA: 1 CM²
AV VELOCITY RATIO: 0.42
BSA FOR ECHO PROCEDURE: 1.68 M2
CV ECHO LV RWT: 0.38 CM
DOP CALC AO PEAK VEL: 2.6 M/S
DOP CALC AO VTI: 61.4 CM
DOP CALC LVOT AREA: 2.5 CM2
DOP CALC LVOT DIAMETER: 1.8 CM
DOP CALC LVOT PEAK VEL: 1.1 M/S
DOP CALC LVOT STROKE VOLUME: 60.8 CM3
DOP CALC MV VTI: 55.2 CM
DOP CALCLVOT PEAK VEL VTI: 23.9 CM
E WAVE DECELERATION TIME: 351 MSEC
E/A RATIO: 0.82
E/E' RATIO: 16 M/S
ECHO LV POSTERIOR WALL: 0.9 CM (ref 0.6–1.1)
FRACTIONAL SHORTENING: 38.3 % (ref 28–44)
INTERVENTRICULAR SEPTUM: 0.8 CM (ref 0.6–1.1)
IVC DIAMETER: 0.89 CM
IVRT: 88 MSEC
LEFT ATRIUM AREA SYSTOLIC (APICAL 2 CHAMBER): 23.08 CM2
LEFT ATRIUM AREA SYSTOLIC (APICAL 4 CHAMBER): 17.64 CM2
LEFT ATRIUM VOLUME INDEX MOD: 36 ML/M2
LEFT ATRIUM VOLUME MOD: 60 ML
LEFT INTERNAL DIMENSION IN SYSTOLE: 2.9 CM (ref 2.1–4)
LEFT VENTRICLE DIASTOLIC VOLUME INDEX: 60.24 ML/M2
LEFT VENTRICLE DIASTOLIC VOLUME: 100 ML
LEFT VENTRICLE END DIASTOLIC VOLUME APICAL 2 CHAMBER: 72.64 ML
LEFT VENTRICLE END DIASTOLIC VOLUME APICAL 4 CHAMBER: 70.45 ML
LEFT VENTRICLE END SYSTOLIC VOLUME APICAL 2 CHAMBER: 78.06 ML
LEFT VENTRICLE END SYSTOLIC VOLUME APICAL 4 CHAMBER: 46.24 ML
LEFT VENTRICLE MASS INDEX: 79.7 G/M2
LEFT VENTRICLE SYSTOLIC VOLUME INDEX: 18.7 ML/M2
LEFT VENTRICLE SYSTOLIC VOLUME: 31 ML
LEFT VENTRICULAR INTERNAL DIMENSION IN DIASTOLE: 4.7 CM (ref 3.5–6)
LEFT VENTRICULAR MASS: 132.3 G
LV LATERAL E/E' RATIO: 16 M/S
LV SEPTAL E/E' RATIO: 16 M/S
LVED V (TEICH): 100.28 ML
LVES V (TEICH): 30.88 ML
LVOT MG: 2.92 MMHG
LVOT MV: 0.82 CM/S
MR PISA EROA: 0.35 CM2
MV A" WAVE DURATION": 95.15 MSEC
MV MEAN GRADIENT: 3 MMHG
MV PEAK A VEL: 1.37 M/S
MV PEAK E VEL: 1.12 M/S
MV PEAK GRADIENT: 10 MMHG
MV STENOSIS PRESSURE HALF TIME: 101.73 MS
MV VALVE AREA BY CONTINUITY EQUATION: 1.1 CM2
MV VALVE AREA P 1/2 METHOD: 2.16 CM2
OHS CV RV/LV RATIO: 0.68 CM
OHS LV EJECTION FRACTION SIMPSONS BIPLANE MOD: 68 %
PISA AR MAX VEL: 3.8 M/S
PISA MRMAX VEL: 6.4 M/S
PISA RADIUS: 0.98 CM
PISA TR MAX VEL: 2.8 M/S
PISA VN NYQUIST MS: 0.37 M/S
PISA VN NYQUIST: 0.37 M/S
PULM VEIN S/D RATIO: 1.7
PV PEAK D VEL: 0.44 M/S
PV PEAK GRADIENT: 6 MMHG
PV PEAK S VEL: 0.75 M/S
PV PEAK VELOCITY: 1.2 M/S
RA PRESSURE ESTIMATED: 3 MMHG
RA VOL SYS: 29.57 ML
RIGHT ATRIAL AREA: 12.2 CM2
RIGHT ATRIUM VOLUME AREA LENGTH APICAL 4 CHAMBER: 28.42 ML
RIGHT VENTRICLE DIASTOLIC BASEL DIMENSION: 3.2 CM
RV TB RVSP: 6 MMHG
RV TISSUE DOPPLER FREE WALL SYSTOLIC VELOCITY 1 (APICAL 4 CHAMBER VIEW): 8.8 CM/S
SINUS: 2.31 CM
STJ: 2.15 CM
TDI LATERAL: 0.07 M/S
TDI SEPTAL: 0.07 M/S
TDI: 0.07 M/S
TR MAX PG: 32 MMHG
TRICUSPID ANNULAR PLANE SYSTOLIC EXCURSION: 1.93 CM
TV REST PULMONARY ARTERY PRESSURE: 34 MMHG
Z-SCORE OF LEFT VENTRICULAR DIMENSION IN END DIASTOLE: 0.11
Z-SCORE OF LEFT VENTRICULAR DIMENSION IN END SYSTOLE: 0.05

## 2025-04-07 PROCEDURE — 93306 TTE W/DOPPLER COMPLETE: CPT | Mod: 26,,, | Performed by: STUDENT IN AN ORGANIZED HEALTH CARE EDUCATION/TRAINING PROGRAM

## 2025-04-07 PROCEDURE — 93306 TTE W/DOPPLER COMPLETE: CPT | Mod: PN

## 2025-04-08 ENCOUNTER — RESULTS FOLLOW-UP (OUTPATIENT)
Dept: CARDIOLOGY | Facility: CLINIC | Age: 80
End: 2025-04-08

## 2025-04-08 ENCOUNTER — TELEPHONE (OUTPATIENT)
Dept: CARDIOLOGY | Facility: CLINIC | Age: 80
End: 2025-04-08
Payer: MEDICARE

## 2025-04-08 NOTE — TELEPHONE ENCOUNTER
----- Message from Irvin Castano MD sent at 4/8/2025  1:10 PM CDT -----  Please contact the patient and let them know that their results are stable. A few very mild leaky and tight valves that will monitor every year with echocardiogram.   ----- Message -----  From: Irvin Pool MD  Sent: 4/7/2025  10:35 PM CDT  To: Irvin Castano MD

## 2025-04-28 ENCOUNTER — LAB VISIT (OUTPATIENT)
Dept: LAB | Facility: HOSPITAL | Age: 80
End: 2025-04-28
Attending: PHYSICIAN ASSISTANT
Payer: MEDICARE

## 2025-04-28 ENCOUNTER — TELEPHONE (OUTPATIENT)
Dept: FAMILY MEDICINE | Facility: CLINIC | Age: 80
End: 2025-04-28
Payer: MEDICARE

## 2025-04-28 ENCOUNTER — RESULTS FOLLOW-UP (OUTPATIENT)
Dept: FAMILY MEDICINE | Facility: CLINIC | Age: 80
End: 2025-04-28

## 2025-04-28 DIAGNOSIS — E87.5 HYPERKALEMIA: Primary | ICD-10-CM

## 2025-04-28 DIAGNOSIS — E87.5 HYPERKALEMIA: ICD-10-CM

## 2025-04-28 LAB — POTASSIUM SERPL-SCNC: 5.5 MMOL/L (ref 3.5–5.1)

## 2025-04-28 PROCEDURE — 36415 COLL VENOUS BLD VENIPUNCTURE: CPT | Mod: PN

## 2025-04-28 PROCEDURE — 84132 ASSAY OF SERUM POTASSIUM: CPT | Mod: PN

## 2025-04-28 RX ORDER — FUROSEMIDE 20 MG/1
20 TABLET ORAL DAILY
Qty: 30 TABLET | Refills: 11 | Status: SHIPPED | OUTPATIENT
Start: 2025-04-28

## 2025-04-28 NOTE — TELEPHONE ENCOUNTER
Spoke to pt. Start lasix daily. Report to surgery and let them test potassium there to determine whether low enough for surgery.

## 2025-04-28 NOTE — TELEPHONE ENCOUNTER
Artie Witt Staff  Caller: Unspecified (Today,  4:42 PM)  Type:  Test Results    Who Called: pt  Name of Test (Lab/Mammo/Etc): lab  Date of Test:  04/28  Ordering Provider: nathaniel  Where the test was performed:  [facility]  Would the patient rather a call back or a response via [facility]? Call  Best Call Back Number:   [phone]  Additional Information:

## 2025-07-24 ENCOUNTER — OFFICE VISIT (OUTPATIENT)
Dept: CARDIOLOGY | Facility: CLINIC | Age: 80
End: 2025-07-24
Payer: MEDICARE

## 2025-07-24 VITALS
WEIGHT: 137.38 LBS | SYSTOLIC BLOOD PRESSURE: 136 MMHG | OXYGEN SATURATION: 95 % | BODY MASS INDEX: 25.28 KG/M2 | HEIGHT: 62 IN | HEART RATE: 52 BPM | DIASTOLIC BLOOD PRESSURE: 80 MMHG

## 2025-07-24 DIAGNOSIS — I10 ESSENTIAL HYPERTENSION: Primary | ICD-10-CM

## 2025-07-24 DIAGNOSIS — I10 PRIMARY HYPERTENSION: ICD-10-CM

## 2025-07-24 PROCEDURE — 99213 OFFICE O/P EST LOW 20 MIN: CPT | Mod: PBBFAC,PO | Performed by: STUDENT IN AN ORGANIZED HEALTH CARE EDUCATION/TRAINING PROGRAM

## 2025-07-24 PROCEDURE — 99999 PR PBB SHADOW E&M-EST. PATIENT-LVL III: CPT | Mod: PBBFAC,,, | Performed by: STUDENT IN AN ORGANIZED HEALTH CARE EDUCATION/TRAINING PROGRAM

## 2025-07-24 NOTE — PROGRESS NOTES
Cardiology Clinic Visit    History of Present Illness:       Yajaira Garcia is a pleasant 79 y.o. female with PMHx of HTN, TIA here for follow up. Voiced no CV complaints. Tolerating meds without side effects. Echo with mild to moderate AS and asymptomatic. Denies cp, sob, palpitations, presyncope/dizziness, syncope, orthopnea, PND, bendopnea, decrease ET, NVDC, fever, chills.    ECG NSR, septal age undetermined.       Echo 4/7/25    Left Ventricle: The left ventricle is normal in size. Normal wall thickness. There is normal systolic function. Quantitated ejection fraction is 68%. Grade II diastolic dysfunction. Elevated left ventricular filling pressure.    Right Ventricle: The right ventricle is normal in size. Systolic function is normal. TAPSE is 1.93 cm.    Aortic Valve: There is mild to moderate stenosis. Aortic valve area by VTI is 1.0 cm². Aortic valve peak velocity is 2.6 m/s. Mean gradient is 15 mmHg. The dimensionless index is 0.39. There is mild aortic regurgitation.    Mitral Valve: There is mild regurgitation.    Tricuspid Valve: There is mild regurgitation.    Pulmonary Artery: The estimated pulmonary artery systolic pressure is 34 mmHg.    IVC/SVC: Normal venous pressure at 3 mmHg.    History obtained by patient interview and supplemented by nursing documentation and chart review.   PMHx:  has a past medical history of Acute posthemorrhagic anemia (12/28/2016), Arthritis, Degenerative joint disease (DJD) of hip (12/27/2016), Hypertension, and Stroke (10/2015).   SurgHx:  has a past surgical history that includes Carpal tunnel release (Bilateral, 1998); Kidney stone surgery (Right); Tubal ligation; Gastric Sleeve (08/2016); Hiatal hernia repair (08/2016); Hip surgery (Right, 12/27/2017); Hip surgery (Left, 12/2021); and Eye surgery (Left, 2022).   FamHx: family history includes Cancer in her father; Dementia in her mother; No Known Problems in her brother.   SocialHx:  reports that she has never  "smoked. She has never been exposed to tobacco smoke. She has never used smokeless tobacco. She reports that she does not drink alcohol.  Home Meds:  Current Outpatient Medications   Medication Instructions    amLODIPine (NORVASC) 10 mg, Oral, Daily    calcium carbonate 500 mg, Once    celecoxib (CELEBREX) 200 mg, Oral, Daily    colchicine (COLCRYS) 0.6 mg, Oral, Daily    ibuprofen (ADVIL,MOTRIN) 800 mg, 3 times daily    multivitamin with minerals tablet 2 tablets, Daily    valsartan (DIOVAN) 160 mg, Oral, Daily, Dc bystolic       Review of Systems: Comprehensive ROS was performed and is negative unless otherwise noted in HPI.   Objective   Objective/Exam:   /80 (BP Location: Right arm, Patient Position: Sitting)   Pulse (!) 52   Ht 5' 2" (1.575 m)   Wt 62.3 kg (137 lb 5.6 oz)   SpO2 95%   BMI 25.12 kg/m²    Wt Readings from Last 4 Encounters:   07/24/25 62.3 kg (137 lb 5.6 oz)   04/07/25 64.9 kg (143 lb)   04/01/25 64.9 kg (143 lb 1.3 oz)   03/27/25 66.6 kg (146 lb 13.2 oz)      Constitutional: NAD, Atraumatic, Conversant   HEENT: MMM, Sclera anicteric, No JVD   Cardiovascular: RRR, no murmurs noted, no chest tenderness to palpation, 2+ radial pulses b/l  Pulmonary: normal respiratory effort, CTAB, no crackles, wheezes  Abdominal: S/NT/ND  Musculoskeletal: No lower extremity edema noted b/l  Neurological: No gross neurological deficits  Skin: W/D/I  Psych: Appropriate affect, normal mood  Labs/Imaging/Procedures   Personally reviewed  Lab Results   Component Value Date     03/28/2025     10/12/2021    K 5.5 (H) 04/28/2025    K 4.9 10/12/2021     03/28/2025     10/12/2021    CO2 24 03/28/2025    CO2 26 10/12/2021    BUN 28 (H) 03/28/2025    CREATININE 1.4 03/28/2025    ANIONGAP 10 03/28/2025     No results found for: "HGBA1C"  No results found for: "BNP", "BNPTRIAGEBLO"   Lab Results   Component Value Date    WBC 11.86 10/12/2021    HGB 12.6 10/12/2021    HCT 38.7 10/12/2021    PLT " "304 10/12/2021    GRAN 10.5 (H) 10/12/2021    GRAN 88.3 (H) 10/12/2021     Lab Results   Component Value Date    CHOL 190 01/17/2018    HDL 64 01/17/2018    LDLCALC 110 (H) 01/17/2018    TRIG 74 01/17/2018     Lab Results   Component Value Date    TSH 0.55 01/17/2018     No results found for: "APOLIPOPROTE"  No results found for: "LIPOA"     TTE:  No results found for this or any previous visit.    Stress Testing:   No results found for this or any previous visit.     Coronary Angiogram:  No results found for this or any previous visit.      -Reviewing Medical records & lab results  -Independently reviewing and interpreting (if not documented by myself) EKGs, echocardiograms, catherizations   -Obtaining a history, performing a relevant exam, counseling/educating the patient/family  -Documenting clinical information in the EMR including ordering of tests  -Care coordination and communicating with other health care providers       Problem List:     1. Essential hypertension    2. Primary hypertension        Assessment/Plan:     Assessment & Plan    AORTIC STENOSIS:  - Mild to moderate aortic stenosis noted on recent echocardiogram.  - Current condition does not warrant intervention due to lack of severe symptoms.  - Explained the nature of aortic stenosis, describing how calcium accumulation affects valve opening over time.  - Discussed the relationship between aortic valve function and blood supply to the body, clarifying potential causes of dizziness.  - Ordered repeat echocardiogram annually to monitor aortic stenosis progression.    HYPERTENSION:  - BP management maintained despite slightly elevated readings.  - Continued current blood pressure medication at present dosage.    MEDICATION MANAGEMENT:  - Discontinued Lasix as it is no longer indicated.    FOLLOW-UP CARE:  - Follow up in 6 months to 1 year.  - Contact the office if shortness of breath when walking, chest pain, or dizziness occurs.            Patient " expressed verbal understanding and agreed with the plan     Return sooner for concerns or questions. If symptoms persist go to the ED.  I have reviewed all pertinent data including patient's medical history in detail and updated the computerized patient record.     Total time spent counseling greater than fifty percent of total visit time.  Counseling included discussion regarding imaging findings, diagnosis, possibilities, treatment options, risks and benefits.      Thank you for the opportunity to care for this patient. Please don't hesitate to reach out with any questions/concerns         Irvin Vargas MD  Cardiovascular Disease; Interventional Cardiology  [facility]